# Patient Record
Sex: MALE | Race: ASIAN | NOT HISPANIC OR LATINO | Employment: FULL TIME | URBAN - METROPOLITAN AREA
[De-identification: names, ages, dates, MRNs, and addresses within clinical notes are randomized per-mention and may not be internally consistent; named-entity substitution may affect disease eponyms.]

---

## 2018-10-13 ENCOUNTER — AMBULATORY - HEALTHEAST (OUTPATIENT)
Dept: NURSING | Facility: CLINIC | Age: 28
End: 2018-10-13

## 2019-12-17 ENCOUNTER — OFFICE VISIT - HEALTHEAST (OUTPATIENT)
Dept: FAMILY MEDICINE | Facility: CLINIC | Age: 29
End: 2019-12-17

## 2019-12-17 ENCOUNTER — COMMUNICATION - HEALTHEAST (OUTPATIENT)
Dept: NURSING | Facility: CLINIC | Age: 29
End: 2019-12-17

## 2019-12-17 DIAGNOSIS — Z76.89 ESTABLISHING CARE WITH NEW DOCTOR, ENCOUNTER FOR: ICD-10-CM

## 2019-12-17 DIAGNOSIS — S06.6X9S SUBARACHNOID HEMORRHAGE FOLLOWING INJURY, WITH LOSS OF CONSCIOUSNESS, SEQUELA (H): ICD-10-CM

## 2019-12-17 DIAGNOSIS — Z60.9 SOCIAL PROBLEM: ICD-10-CM

## 2019-12-17 DIAGNOSIS — S06.9XAS LATE EFFECT OF INTRACRANIAL INJURY (H): ICD-10-CM

## 2019-12-17 DIAGNOSIS — H53.003 LAZY EYE OF BOTH SIDES: ICD-10-CM

## 2019-12-17 SDOH — SOCIAL STABILITY - SOCIAL INSECURITY: PROBLEM RELATED TO SOCIAL ENVIRONMENT, UNSPECIFIED: Z60.9

## 2019-12-17 ASSESSMENT — MIFFLIN-ST. JEOR: SCORE: 1492.66

## 2019-12-18 ENCOUNTER — TRANSCRIBE ORDERS (OUTPATIENT)
Dept: OTHER | Age: 29
End: 2019-12-18

## 2019-12-18 DIAGNOSIS — S06.9XAS LATE EFFECT OF INTRACRANIAL INJURY (H): Primary | ICD-10-CM

## 2020-01-03 ENCOUNTER — AMBULATORY - HEALTHEAST (OUTPATIENT)
Dept: NURSING | Facility: CLINIC | Age: 30
End: 2020-01-03

## 2020-01-03 ASSESSMENT — ACTIVITIES OF DAILY LIVING (ADL): DEPENDENT_IADLS:: INDEPENDENT

## 2020-01-06 ENCOUNTER — COMMUNICATION - HEALTHEAST (OUTPATIENT)
Dept: NURSING | Facility: CLINIC | Age: 30
End: 2020-01-06

## 2020-01-17 ENCOUNTER — OFFICE VISIT (OUTPATIENT)
Dept: PHYSICAL MEDICINE AND REHAB | Facility: CLINIC | Age: 30
End: 2020-01-17
Attending: FAMILY MEDICINE
Payer: COMMERCIAL

## 2020-01-17 VITALS
OXYGEN SATURATION: 100 % | HEIGHT: 61 IN | SYSTOLIC BLOOD PRESSURE: 106 MMHG | DIASTOLIC BLOOD PRESSURE: 69 MMHG | HEART RATE: 56 BPM

## 2020-01-17 DIAGNOSIS — R42 DIZZINESS: Primary | ICD-10-CM

## 2020-01-17 RX ORDER — MECLIZINE HYDROCHLORIDE 25 MG/1
25 TABLET ORAL 3 TIMES DAILY PRN
Qty: 60 TABLET | Refills: 0 | Status: SHIPPED | OUTPATIENT
Start: 2020-01-17 | End: 2020-01-17

## 2020-01-17 RX ORDER — MECLIZINE HYDROCHLORIDE 25 MG/1
25 TABLET ORAL 3 TIMES DAILY PRN
Qty: 60 TABLET | Refills: 0 | Status: SHIPPED | OUTPATIENT
Start: 2020-01-17 | End: 2024-02-22

## 2020-01-17 SDOH — HEALTH STABILITY: MENTAL HEALTH: HOW OFTEN DO YOU HAVE A DRINK CONTAINING ALCOHOL?: NEVER

## 2020-01-17 ASSESSMENT — ANXIETY QUESTIONNAIRES
3. WORRYING TOO MUCH ABOUT DIFFERENT THINGS: NOT AT ALL
GAD7 TOTAL SCORE: 0
7. FEELING AFRAID AS IF SOMETHING AWFUL MIGHT HAPPEN: NOT AT ALL
4. TROUBLE RELAXING: NOT AT ALL
2. NOT BEING ABLE TO STOP OR CONTROL WORRYING: NOT AT ALL
5. BEING SO RESTLESS THAT IT IS HARD TO SIT STILL: NOT AT ALL
GAD7 TOTAL SCORE: 0
7. FEELING AFRAID AS IF SOMETHING AWFUL MIGHT HAPPEN: NOT AT ALL
6. BECOMING EASILY ANNOYED OR IRRITABLE: NOT AT ALL
1. FEELING NERVOUS, ANXIOUS, OR ON EDGE: NOT AT ALL
GAD7 TOTAL SCORE: 0

## 2020-01-17 ASSESSMENT — PAIN SCALES - GENERAL: PAINLEVEL: MILD PAIN (2)

## 2020-01-17 ASSESSMENT — PATIENT HEALTH QUESTIONNAIRE - PHQ9
10. IF YOU CHECKED OFF ANY PROBLEMS, HOW DIFFICULT HAVE THESE PROBLEMS MADE IT FOR YOU TO DO YOUR WORK, TAKE CARE OF THINGS AT HOME, OR GET ALONG WITH OTHER PEOPLE: NOT DIFFICULT AT ALL
SUM OF ALL RESPONSES TO PHQ QUESTIONS 1-9: 0
SUM OF ALL RESPONSES TO PHQ QUESTIONS 1-9: 0

## 2020-01-17 NOTE — LETTER
January 17, 2020          Excuse from work for appointments Seen in clinic January 17, 2020.            Sincerely,      Pricilla Hope MD
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
## 2020-01-18 ASSESSMENT — PATIENT HEALTH QUESTIONNAIRE - PHQ9: SUM OF ALL RESPONSES TO PHQ QUESTIONS 1-9: 0

## 2020-01-18 ASSESSMENT — ANXIETY QUESTIONNAIRES: GAD7 TOTAL SCORE: 0

## 2020-01-24 ENCOUNTER — ANCILLARY PROCEDURE (OUTPATIENT)
Dept: CT IMAGING | Facility: CLINIC | Age: 30
End: 2020-01-24
Attending: PHYSICAL MEDICINE & REHABILITATION
Payer: COMMERCIAL

## 2020-01-27 ENCOUNTER — TELEPHONE (OUTPATIENT)
Dept: PHYSICAL MEDICINE AND REHAB | Facility: CLINIC | Age: 30
End: 2020-01-27

## 2020-02-03 ENCOUNTER — COMMUNICATION - HEALTHEAST (OUTPATIENT)
Dept: NURSING | Facility: CLINIC | Age: 30
End: 2020-02-03

## 2020-02-11 ENCOUNTER — TELEPHONE (OUTPATIENT)
Dept: PHYSICAL MEDICINE AND REHAB | Facility: CLINIC | Age: 30
End: 2020-02-11

## 2020-02-18 ENCOUNTER — COMMUNICATION - HEALTHEAST (OUTPATIENT)
Dept: NURSING | Facility: CLINIC | Age: 30
End: 2020-02-18

## 2020-02-26 ENCOUNTER — COMMUNICATION - HEALTHEAST (OUTPATIENT)
Dept: NURSING | Facility: CLINIC | Age: 30
End: 2020-02-26

## 2020-03-11 ENCOUNTER — COMMUNICATION - HEALTHEAST (OUTPATIENT)
Dept: NURSING | Facility: CLINIC | Age: 30
End: 2020-03-11

## 2020-03-13 ENCOUNTER — COMMUNICATION - HEALTHEAST (OUTPATIENT)
Dept: NURSING | Facility: CLINIC | Age: 30
End: 2020-03-13

## 2020-03-24 ENCOUNTER — COMMUNICATION - HEALTHEAST (OUTPATIENT)
Dept: FAMILY MEDICINE | Facility: CLINIC | Age: 30
End: 2020-03-24

## 2020-03-24 ENCOUNTER — OFFICE VISIT - HEALTHEAST (OUTPATIENT)
Dept: FAMILY MEDICINE | Facility: CLINIC | Age: 30
End: 2020-03-24

## 2020-03-24 ENCOUNTER — RECORDS - HEALTHEAST (OUTPATIENT)
Dept: ADMINISTRATIVE | Facility: OTHER | Age: 30
End: 2020-03-24

## 2020-03-24 DIAGNOSIS — S06.9XAS LATE EFFECT OF INTRACRANIAL INJURY (H): ICD-10-CM

## 2020-04-06 ENCOUNTER — COMMUNICATION - HEALTHEAST (OUTPATIENT)
Dept: CARE COORDINATION | Facility: CLINIC | Age: 30
End: 2020-04-06

## 2020-04-14 ENCOUNTER — COMMUNICATION - HEALTHEAST (OUTPATIENT)
Dept: NURSING | Facility: CLINIC | Age: 30
End: 2020-04-14

## 2020-04-24 ENCOUNTER — TELEPHONE (OUTPATIENT)
Dept: PHYSICAL MEDICINE AND REHAB | Facility: CLINIC | Age: 30
End: 2020-04-24

## 2020-04-24 NOTE — TELEPHONE ENCOUNTER
Called patient with assistance of  service. Message left to inform patient that appointment would be converted to telephone visit on 5/1/2020. Contact information provided.

## 2020-04-28 ENCOUNTER — COMMUNICATION - HEALTHEAST (OUTPATIENT)
Dept: NURSING | Facility: CLINIC | Age: 30
End: 2020-04-28

## 2020-04-30 ENCOUNTER — TELEPHONE (OUTPATIENT)
Dept: PHYSICAL MEDICINE AND REHAB | Facility: CLINIC | Age: 30
End: 2020-04-30

## 2020-05-01 ENCOUNTER — VIRTUAL VISIT (OUTPATIENT)
Dept: PHYSICAL MEDICINE AND REHAB | Facility: CLINIC | Age: 30
End: 2020-05-01
Payer: COMMERCIAL

## 2020-05-01 ENCOUNTER — TELEPHONE (OUTPATIENT)
Dept: PHYSICAL MEDICINE AND REHAB | Facility: CLINIC | Age: 30
End: 2020-05-01

## 2020-05-01 DIAGNOSIS — M79.18 MYOFASCIAL PAIN SYNDROME, CERVICAL: ICD-10-CM

## 2020-05-01 DIAGNOSIS — I60.9 SAH (SUBARACHNOID HEMORRHAGE) (H): Primary | ICD-10-CM

## 2020-05-01 DIAGNOSIS — H81.13 BENIGN PAROXYSMAL POSITIONAL VERTIGO, BILATERAL: ICD-10-CM

## 2020-05-01 RX ORDER — ACETAMINOPHEN 500 MG
500-1000 TABLET ORAL EVERY 6 HOURS PRN
COMMUNITY

## 2020-05-01 NOTE — LETTER
May 1, 2020      To Whom it may concern,        Please excuse Javier Dickson from work April 30th and May 1 for Medical appointments.        Sincerely,      Cole Mcarthur DO
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
## 2020-05-07 ENCOUNTER — COMMUNICATION - HEALTHEAST (OUTPATIENT)
Dept: NURSING | Facility: CLINIC | Age: 30
End: 2020-05-07

## 2020-05-18 ENCOUNTER — COMMUNICATION - HEALTHEAST (OUTPATIENT)
Dept: NURSING | Facility: CLINIC | Age: 30
End: 2020-05-18

## 2020-05-21 ENCOUNTER — COMMUNICATION - HEALTHEAST (OUTPATIENT)
Dept: CARE COORDINATION | Facility: CLINIC | Age: 30
End: 2020-05-21

## 2020-06-01 ENCOUNTER — COMMUNICATION - HEALTHEAST (OUTPATIENT)
Dept: NURSING | Facility: CLINIC | Age: 30
End: 2020-06-01

## 2020-09-25 ENCOUNTER — COMMUNICATION - HEALTHEAST (OUTPATIENT)
Dept: NURSING | Facility: CLINIC | Age: 30
End: 2020-09-25

## 2020-10-06 ENCOUNTER — AMBULATORY - HEALTHEAST (OUTPATIENT)
Dept: CARE COORDINATION | Facility: CLINIC | Age: 30
End: 2020-10-06

## 2020-10-06 DIAGNOSIS — Z78.9 HEALTH CARE HOME, ACTIVE CARE COORDINATION: ICD-10-CM

## 2020-10-08 ENCOUNTER — COMMUNICATION - HEALTHEAST (OUTPATIENT)
Dept: NURSING | Facility: CLINIC | Age: 30
End: 2020-10-08

## 2020-10-09 ENCOUNTER — COMMUNICATION - HEALTHEAST (OUTPATIENT)
Dept: NURSING | Facility: CLINIC | Age: 30
End: 2020-10-09

## 2020-11-09 ENCOUNTER — COMMUNICATION - HEALTHEAST (OUTPATIENT)
Dept: NURSING | Facility: CLINIC | Age: 30
End: 2020-11-09

## 2020-11-24 ENCOUNTER — COMMUNICATION - HEALTHEAST (OUTPATIENT)
Dept: CARE COORDINATION | Facility: CLINIC | Age: 30
End: 2020-11-24

## 2020-12-09 ENCOUNTER — COMMUNICATION - HEALTHEAST (OUTPATIENT)
Dept: NURSING | Facility: CLINIC | Age: 30
End: 2020-12-09

## 2020-12-17 ENCOUNTER — COMMUNICATION - HEALTHEAST (OUTPATIENT)
Dept: NURSING | Facility: CLINIC | Age: 30
End: 2020-12-17

## 2021-01-18 ENCOUNTER — COMMUNICATION - HEALTHEAST (OUTPATIENT)
Dept: NURSING | Facility: CLINIC | Age: 31
End: 2021-01-18

## 2021-01-19 ENCOUNTER — COMMUNICATION - HEALTHEAST (OUTPATIENT)
Dept: NURSING | Facility: CLINIC | Age: 31
End: 2021-01-19

## 2021-01-25 ENCOUNTER — COMMUNICATION - HEALTHEAST (OUTPATIENT)
Dept: NURSING | Facility: CLINIC | Age: 31
End: 2021-01-25

## 2021-01-28 ENCOUNTER — COMMUNICATION - HEALTHEAST (OUTPATIENT)
Dept: NURSING | Facility: CLINIC | Age: 31
End: 2021-01-28

## 2021-02-01 ENCOUNTER — COMMUNICATION - HEALTHEAST (OUTPATIENT)
Dept: CARE COORDINATION | Facility: CLINIC | Age: 31
End: 2021-02-01

## 2021-02-08 ENCOUNTER — COMMUNICATION - HEALTHEAST (OUTPATIENT)
Dept: NURSING | Facility: CLINIC | Age: 31
End: 2021-02-08

## 2021-02-25 ENCOUNTER — COMMUNICATION - HEALTHEAST (OUTPATIENT)
Dept: NURSING | Facility: CLINIC | Age: 31
End: 2021-02-25

## 2021-03-02 ENCOUNTER — COMMUNICATION - HEALTHEAST (OUTPATIENT)
Dept: NURSING | Facility: CLINIC | Age: 31
End: 2021-03-02

## 2021-03-15 ENCOUNTER — COMMUNICATION - HEALTHEAST (OUTPATIENT)
Dept: NURSING | Facility: CLINIC | Age: 31
End: 2021-03-15

## 2021-03-16 ENCOUNTER — COMMUNICATION - HEALTHEAST (OUTPATIENT)
Dept: CARE COORDINATION | Facility: CLINIC | Age: 31
End: 2021-03-16

## 2021-03-26 ENCOUNTER — COMMUNICATION - HEALTHEAST (OUTPATIENT)
Dept: NURSING | Facility: CLINIC | Age: 31
End: 2021-03-26

## 2021-03-30 ENCOUNTER — COMMUNICATION - HEALTHEAST (OUTPATIENT)
Dept: CARE COORDINATION | Facility: CLINIC | Age: 31
End: 2021-03-30

## 2021-04-15 ENCOUNTER — COMMUNICATION - HEALTHEAST (OUTPATIENT)
Dept: NURSING | Facility: CLINIC | Age: 31
End: 2021-04-15

## 2021-04-22 ENCOUNTER — COMMUNICATION - HEALTHEAST (OUTPATIENT)
Dept: NURSING | Facility: CLINIC | Age: 31
End: 2021-04-22

## 2021-05-17 ENCOUNTER — COMMUNICATION - HEALTHEAST (OUTPATIENT)
Dept: NURSING | Facility: CLINIC | Age: 31
End: 2021-05-17

## 2021-05-20 ENCOUNTER — RECORDS - HEALTHEAST (OUTPATIENT)
Dept: ADMINISTRATIVE | Facility: OTHER | Age: 31
End: 2021-05-20

## 2021-05-20 ENCOUNTER — COMMUNICATION - HEALTHEAST (OUTPATIENT)
Dept: CARE COORDINATION | Facility: CLINIC | Age: 31
End: 2021-05-20

## 2021-06-01 ENCOUNTER — COMMUNICATION - HEALTHEAST (OUTPATIENT)
Dept: NURSING | Facility: CLINIC | Age: 31
End: 2021-06-01

## 2021-06-03 ENCOUNTER — COMMUNICATION - HEALTHEAST (OUTPATIENT)
Dept: NURSING | Facility: CLINIC | Age: 31
End: 2021-06-03

## 2021-06-04 VITALS
WEIGHT: 137.7 LBS | RESPIRATION RATE: 16 BRPM | DIASTOLIC BLOOD PRESSURE: 66 MMHG | HEIGHT: 64 IN | HEART RATE: 60 BPM | BODY MASS INDEX: 23.51 KG/M2 | SYSTOLIC BLOOD PRESSURE: 90 MMHG
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
## 2021-06-05 NOTE — PROGRESS NOTES
1-6-20  Patient enrolled in [facility] as of 1-3-20  [facility] SW completed assessment.  No upcoming appt at this time.      Plan:  CHW follow up 1-3-20

## 2021-06-05 NOTE — PROGRESS NOTES
2-3-20  Scheduled Follow Up Call: Attempt 1 Community Health Worker called and left a message for the patient. If the patient is returning my call, please transfer the patient to [phone].    No upcoming appt at this time.    Plan:  CHW follow up 2-17-20
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
## 2021-06-06 NOTE — PROGRESS NOTES
3/11/2020   Clinic Care Coordination Contact    Follow Up Progress Note      Assessment:     Goals addressed this encounter:   Goals Addressed                 This Visit's Progress       Patient Stated      Psychosocial (pt-stated)        Goal Statement- I want to work with an agency to assist me with citizenship within 1-2 months    Action steps to achieve this goal  1.  I will meet with  Alban Bergeron at [facility] this Friday 3-13-20 at 9:30am.   2.  I will update CCC team with the status at next outreach.    Updated: 3-11-20 AL  Date goal set:  1/3/2020 BC                 Intervention/Education provided during outreach:   Received call from patient that he has appt with  on 3-13-20 at 9:30am at [facility].            Plan:     Care Coordinator will follow up in a month 4-14-20

## 2021-06-06 NOTE — PROGRESS NOTES
2020    Clinic Care Coordination Contact    Follow Up Progress Note      Assessment: 1-3-20    Goals addressed this encounter:   Goals Addressed                 This Visit's Progress       Patient Stated      Medical (pt-stated)        Goal Statement- I want to address my medical concerns within one month    Action steps to achieve this goal  1.  I will follow up with neurologist on 20 to talk about the result.    2.  I will update CCC team at next outreach.    Updated: 20 AL  Date goal set:  1/3/2020 BC            Psychosocial (pt-stated)        Goal Statement- I want to work with an agency to assist me with citizenship within 1-2 months    Action steps to achieve this goal  1.  I will wait for  Alban Clemente from [facility] to return my call.  2.  I will update CCC team with the status at next outreach.    Updated: 20  Date goal set:  1/3/2020 BC                 Intervention/Education provided during outreach:   Received return call from patient.  Spoke to patient through Luiza  Manish.  Patient reported:  -he works time. Mon-Friday  6 am -4 pm get home after 5pm. Best time to call after 5 pm   -attended neurology appt and has follow up on 20    doctor did not say much but will tell more at next appt on 20  -no calls from [facility]    CHW conference call with patient and supported to connect with [facility] [phone] to check on status of referral for citizenship.  Spoke to staff and prompted patient to verify his name and   Transferred to connect with [facility] assigned .  Supported to leave message for [facility] to call patient back. Provided patient contact number and best time to call due to work schedule.  Patient will check voice message and call [facility]  Provided patient [facility] contact number to call on his own.       Plan:   Care Coordinator will follow up in a month 3-26-20 at 5pm due to work schedule

## 2021-06-07 NOTE — TELEPHONE ENCOUNTER
Work note completed and ready for  at , patient verbalized understanding. No further questions. Lisbeth Keyes LPN

## 2021-06-07 NOTE — PROGRESS NOTES
4/14/2020   Clinic Care Coordination Contact  [facility]/Voicemail       Clinical Data: Care Coordinator Outreach  Outreach attempted x 1. [facility] Luiza  Issac and left message on patient's voicemail with call back information and requested return call.  Plan: Care Coordinator will try to reach patient again in 10 business days.  4-28-20

## 2021-06-07 NOTE — PROGRESS NOTES
4/28/2020  Review chart.  Speciality  faxed order to [facility] for eval and  n-648 citizenship waiver     Clinic Care Coordination Contact  [facility]/Voicemail    Referral Source: PCP  Clinical Data: Care Coordinator Outreach  Outreach attempted x 1.  Luiza  Ashley #[id number] left message on patient's voicemail with call back information and requested return call.  Plan: Care Coordinatorwisharon try to reach patient again 5-7-20

## 2021-06-07 NOTE — PROGRESS NOTES
Clinic Care Coordination Contact    Situation: Patient chart reviewed by care coordinator.    Background: WARREN completed chart review    Assessment: Patient is working with [facility] for citizenship. He was seen by PCP to get referral for N-648 evaluation. Has an appointment to review neurology results on 5/1/2020    Plan/Recommendations: WARREN will review/outreach in about 45 days

## 2021-06-07 NOTE — PROGRESS NOTES
"Javier Dickson is a 29 y.o. male who is being evaluated via a billable telephone visit.      The patient has been notified of following:     \"This telephone visit will be conducted via a call between you and your physician/provider. We have found that certain health care needs can be provided without the need for a physical exam.  This service lets us provide the care you need with a short phone conversation.  If a prescription is necessary we can send it directly to your pharmacy.  If lab work is needed we can place an order for that and you can then stop by our lab to have the test done at a later time.    If during the course of the call the physician/provider feels a telephone visit is not appropriate, you will not be charged for this service.\"         Javier Dickson complains of    Chief Complaint   Patient presents with     Form     medical waiver        I have reviewed and updated the patient's Past Medical History, Social History, Family History and Medication List.    ALLERGIES  Patient has no known allergies.    Additional provider notes:   Person hung up the phone prior to me talking to him.  Appeared upset that this was a telephone encounter as he states he received a message yesterday stating that he had a clinic visit.  Needs a note for work today.  Wife had also answered on the phone and she stated that was not true and told that was a telephone visit.  He also might of been upset that I do not do the medical waiver myself.    Assessment/Plan:  1. Late effect of intracranial injury (H)  Work note provided if patient needs to   Referral placed for neuropsych testing for medical waiver  Will forward to care guide  - [facility] REFERRAL TO MENTAL HEALTH AND ADDICTION  - Adult (18+); Assessment and Testing; Neuropsychological Testing; [facility] [phone]; We will contact you to schedule the appointment or please call with any questions      Phone call duration:  0 minutes      "

## 2021-06-08 NOTE — PROGRESS NOTES
5/7/2020   Clinic Care Coordination Contact    Community Health Worker Follow Up    Goals:   Goals Addressed    None       Called and spoke to patient. Stated he is not available to talk because he is taking his son to the hospital.  Patient unable to write down CHW number.  Informed patient CHW will follow up next week and to focus on taking care of his son.    CHW Next Follow-up:  5-14-20

## 2021-06-08 NOTE — PROGRESS NOTES
5/18/2020   Clinic Care Coordination Contact  [facility]/Voicemail       Clinical Data: Care Coordinator Outreach  Outreach attempted x 1. Called with Luiza  Cheryl #[phone] and left message on patient's voicemail with call back information and requested return call.  Plan: Care Coordinator  will try to reach patient again 6-1-20

## 2021-06-08 NOTE — PROGRESS NOTES
Clinic Care Coordination Contact    Situation: Patient chart reviewed by care coordinator.    Background: SW completed chart review    Assessment: Patient has been unable to be reached recently by CHW. CHW doing standard outreach. Last contact, patient is still working toward citizenship goal.    Plan/Recommendations: SW will chart review/outreach in 45 days

## 2021-06-08 NOTE — PROGRESS NOTES
6/1/2020  Clinic Care Coordination Contact  [facility]/Voicemail       Clinical Data: Care Coordinator Outreach  Outreach attempted x 3.  Left message on patient's voicemail with call back information and requested return call.  Plan: Care Coordinator will send disenrollment letter with care coordinator contact information via mail.   Care Coordinator will do no further outreaches at this time.    Patient has been mailed disenrollment letter and was provided with CHW contact information if they are interested in accessing Clinic Care Coordination.   Order for Care Management has been closed, no further outreach will be done at this time and patient can be re-referred.

## 2021-06-09 ENCOUNTER — COMMUNICATION - HEALTHEAST (OUTPATIENT)
Dept: NURSING | Facility: CLINIC | Age: 31
End: 2021-06-09

## 2021-06-10 ENCOUNTER — COMMUNICATION - HEALTHEAST (OUTPATIENT)
Dept: NURSING | Facility: CLINIC | Age: 31
End: 2021-06-10

## 2021-06-10 ENCOUNTER — COMMUNICATION - HEALTHEAST (OUTPATIENT)
Dept: CARE COORDINATION | Facility: CLINIC | Age: 31
End: 2021-06-10

## 2021-06-11 NOTE — PROGRESS NOTES
9/25/2020  Received call from patient.  Connected with [facility]  Tonia.  Patient requesting help regarding status of his citizenship application.  Stated he turned  in the medical waiver form already to the . He had medical waiver done at [facility].    Conference call to [facility] and verify  is [facility]. CHW left voices with  [facility] to call patient back.  Provided patient call back number.    Provided patient number to [facility]

## 2021-06-11 NOTE — PROGRESS NOTES
9/25/2020  Received call from patient again.   Stated he does not want transfer clinic to [place].   Stated he wants to stay with [facility] because it is better.  Ask if his whole family can still go to [facility] even though they move.  Explained to patient that his family can continue to come to [facility].  Stated his sister in law was able to continue to go to [facility] and lives in [place].    Scheduled initial assessment with ALOK KELLEY on 10-8-20 at 2pm.     Clinic Care Coordination Contact  Community Health Worker Initial Outreach    CHW Initial Information Gathering:  Referral Source: Self-patient/Caregiver  Preferred Urgent Care: [facility], [phone]  Current living arrangement:: I live in a private home with family  Type of residence:: Other([place] home)  Community Resources: None  Supplies used at home:: None  Equipment Currently Used at Home: none  Informal Support system:: Family, Spouse  Transportation means:: Regular car       Patient accepts CC: Yes. Patient scheduled for assessment with ALOK KELLEY on 10-8-20  at 2pm. Patient noted desire to discuss help citizenship and recently move to [place], [place].     Patient changed is mind does not want to transfer care to clinic in [place] will continue to stay at [facility].    CHW Follow up: 10-8-20 review assessment, goals and consult with CCC SW/RN if needed.

## 2021-06-12 NOTE — PROGRESS NOTES
11/9/2020   Clinic Care Coordination Contact    Community Health Worker Follow Up    Goals:   Goals Addressed                 This Visit's Progress       Patient Stated      Problem Solving (pt-stated)   20%     Goal Statement: I want to know the status of my citizenship application within one month  Date Goal set: 10/08/20  Barriers: Pamela  Strengths:   Date to Achieve By: 11/30/2020  Patient expressed understanding of goal: Yes  Action steps to achieve this goal:  1. I will wait to hear from [facility] or wait for packet/form from [facility]  2. I will update CC Team at next outreach    Updated: 11-9-20 AL          Received call from patient's wife.  [facility] Luiza : Mark Contreras   Wife reported they have not received anything in the mail or hear from [facility] regarding citizenship process.    Provided wife Alban Bergeron , [facility] contact information for them to call and follow up on citizenship.  CHW supported to connect with [facility] and left voice message to call them back.      CHW Next Follow-up: 12-9-20    CHW Plan: follow up on goal

## 2021-06-12 NOTE — PROGRESS NOTES
10/8/2020  Patient enrolled in [facility] as of 10-8-20  Reviewed assessment, goals, and any delegations from [facility] RN/CCC SW    CHW follow up: 11-10-20  Wife also enrolled in [facility]

## 2021-06-12 NOTE — PROGRESS NOTES
Clinic Care Coordination Contact  Program: none   County: [facility] Case #: [id number]   [facility] Worker:   Lianne #:   PMI #: [id number]  Date Applied:   Renewal Month:       Outreach:   10/9/20: FRW spoke with pt. He said he already updated his address and income at the [facility]. FRW verified the address [place] and he is active with [place]. He has no other needs at this time.    Health Insurance:  [place]: Medical Assistance.  Elig Type AA: Parent of a dependent child  Eligibility Begin Date: 04/01/2020  Eligibility End Date: 12/31/2020    MA12 - Prepaid Medical Assistance Program (PMAP) delivered through [facility].    Referral/Screening:   Patient needs help with insurance. Had [employer] because he had a job. Just moved to [place], which is [facility]. He has no job, so he needs to update income and address. Also need help applying for/changing address for SNAP.     No income right now.     Two adults and two children   Children's mother is Fabi Paw   Child is Ree Ozzy [facility]     Goals Addressed:   Goals Addressed                 This Visit's Progress       Patient Stated      Financial Wellbeing (pt-stated)   100%     Goal Statement: I want to update my family and my insurance/snap information within one month  Date Goal set: 10/08/20  Barriers: Language, Moved  Strengths:   Date to Achieve By: 11/30/2020  Patient expressed understanding of goal: Yes  Action steps to achieve this goal:  1. I already updated my address and income to the [facility] and [facility].

## 2021-06-13 NOTE — PROGRESS NOTES
Clinic Care Coordination Contact    Situation: Patient chart reviewed by care coordinator.    Background: WARREN completed chart review     Assessment: SW didn't hear from [facility] yet, CHW provided patient the phone number for them to call too.     Plan/Recommendations: WARREN will chart review in 45 days
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
## 2021-06-13 NOTE — PROGRESS NOTES
Clinic Care Coordination Contact    Follow Up Progress Note      Assessment: WARREN contacted Javier Dickson with an . He reported he is already working with someone who is helping him find a job. He reported he was going to call his  to update his income. WARREN encouraged this so he may be able to get an increase in support.     Goals addressed this encounter:   Goals Addressed                 This Visit's Progress      COMPLETED: Other (pt-stated)        I have someone from a [employer] helping me with employment             Other (pt-stated)        Goal Statement: I would like to know if am eligible for any financial assistance or county benefit SNAP, rental assistance due to no income at this time.  Date Goal set: 12/9/2020    Barriers: language, no income, terminated from work  Strengths: family support  Date to Achieve By: 3-1-20  Patient expressed understanding of goal: Yes  Action steps to achieve this goal:  1. I will talk to our financial worker to update our income change  2. I will update at next outreach.                Intervention/Education provided during outreach: See above          Plan:   Standard Outreach

## 2021-06-14 NOTE — PROGRESS NOTES
Clinic Care Coordination Contact  Program: [facility]: Leonel (Benefits in [facility])  [facility] Case #: [id number]   County Worker: Tonio, [phone]       Outreach:    1/28/2021: FRW spoke with patient, he said he spoke with the  and they are only giving him $90 this month and will process the changes for next month. His benefit will increase for next month. Patient said he doesn't have enough money this month for food, FRW inquired if he needed help with food support resources, pt said he didn't know how to get to the food shelf.  Message sent to CHW to follow up. FRW will check EZ info line in 1 month to see if benefits increased.   1/19/2021: FRW called patient regarding referral. Patient said he reported his income to [facility] in December when he stopped working. He wrote them a letter and verified with them that they received it. His benefits went from $500 to $90. Their benefits should not have gone down. FRW called the  listed on patient's notice and left detailed VM. FRW will follow up next week.     (patient read off his  information. Listed on there was Deyanira at #[phone], and another phone #[phone].   Ext [phone] goes to Tonio- [facility] supervisor. Ext [phone] goes to a male ).       Referral/Screening:   Financial Resource Worker Screening     [facility] Benefits  Is patient requesting help applying for [facility] benefits?: No  Is patient requesting an increase in SNAP/CASH?: Yes  Are you due for a renewal?: No  Did you have an income change?: No  Was there a household change?: No     [place]:  [place] No  Is this an insurance renewal?: No  Is this a new insurance application request?: No     Any other information for the FRW?: not sure why he only gets $90 not $300 of food stamp. he has still not working and wife not working.  no income.    Hser Paw- Income ended 12/9/20  No one is working in the  household   $500 went down to $90.       Goals Addressed:   Goals Addressed                 This Visit's Progress       Patient Stated      COMPLETED: Other (pt-stated)        Goal Statement: I have food support and rental assistance from the [facility].  Date Goal set: 12/9/2020    Barriers: language, no income, terminated from work  Strengths: family support  Date to Achieve By: 3-1-20  Patient expressed understanding of goal: Yes  Personal Plan:  1. I called the  on 1/19 to report my income change again.   2. I spoke with the  and they will increase my SNAP benefit in February 2021.   3. I will connect with the CHW, Arlette, for further food support resources.       [facility] Human Services  [address] [place] [id number]  [phone]  info line to check on benefits  [phone]
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
## 2021-06-14 NOTE — PROGRESS NOTES
Clinic Care Coordination Contact  Program: [facility]: Leonel (Benefits in [facility])  [facility] Case #: [id number]   County Worker: Tonio, [phone]       Outreach:    1/19/2021: FRW called patient regarding referral. Patient said he reported his income to [facility] in December when he stopped working. He wrote them a letter and verified with them that they received it. His benefits went from $500 to $90. Their benefits should not have gone down. FRW called the  listed on patient's notice and left detailed VM. FRW will follow up next week.     (patient read off his  information. Listed on there was Deyanira at #[phone], and another phone #[phone].   Ext [phone] goes to Tonio- [facility] supervisor.   Ext [phone] goes to a male ).     Referral/Screening:   Financial Resource Worker Screening     [facility] Benefits  Is patient requesting help applying for [facility] benefits?: No  Is patient requesting an increase in SNAP/CASH?: Yes  Are you due for a renewal?: No  Did you have an income change?: No  Was there a household change?: No     [place]:  [place] No  Is this an insurance renewal?: No  Is this a new insurance application request?: No     Any other information for the FRW?: not sure why he only gets $90 not $300 of food stamp. he has still not working and wife not working.  no income.    Hser Paw- Income ended 12/9/20  No one is working in the household   $500 went down to $90.       Goals Addressed:

## 2021-06-14 NOTE — PROGRESS NOTES
Clinic Care Coordination Contact  Care Team Conversations     SW attempted to reach patient to follow up on food resources and unemployment. No voicemail setup. SW will follow up in one week.

## 2021-06-14 NOTE — PROGRESS NOTES
1/18/2021   Clinic Care Coordination Contact  Financial Resource Worker Screening    [facility] Benefits  Is patient requesting help applying for [facility] benefits?: No  Is patient requesting an increase in SNAP/CASH?: Yes  Are you due for a renewal?: No  Did you have an income change?: No  Was there a household change?: No    [place]:  [place] No  Is this an insurance renewal?: No  Is this a new insurance application request?: No    Any other information for the FRW?: not sure why he only gets $90 not $300 of food stamp. he has still not working and wife not working.  no income.    Care Coordination team will tell patient:   Thank you for answering all the questions, based on screening questions, our Financial Resource Worker will reach out to you with additional questions and next steps.    Clinic Care Coordination Contact    Community Health Worker Follow Up    Goals:   Goals Addressed                 This Visit's Progress       Patient Stated      Other (pt-stated)   100%     Goal Statement: I have food support and rental assistance from the [facility].  Date Goal set: 12/9/2020    Barriers: language, no income, terminated from work  Strengths: family support  Date to Achieve By: 3-1-20  Patient expressed understanding of goal: Yes  Personal Plan:  I will to call to financial worker to update our income change    [facility] Human Services  [address] [place] [address]  [phone] EZ info line to check on benefits  [phone]           Problem Solving (pt-stated)   30%     Goal Statement: I want to know the status of my citizenship application within one month  Date Goal set: 10/08/20  Barriers: Pamela  Strengths:   Date to Achieve By: 11/30/2020  Patient expressed understanding of goal: Yes  Action steps to achieve this goal:  1. [facility] and ask for Luiza  regarding my citizenship application.   2. I will update CC Team at next  outreach    [facility] Legal [facility] ([facility]support to apply for Naturalization (N-400)  [address]  [place] [address]  [phone]  Fax: [phone]  Intake Line [phone]    Updated: 1-18-21 KERON Jarrett :  [facility]  ID#  [id number]  Patient reported:  -received $90 for food stamp and rent assistance for last month but not sure for this month.  Use to get more of food stamp not sure why he only gets $90.  -still not working no job. Wife no job or income.  -not sure about his car payment since he has no job or income.  Sent referral to FRW regarding food stampl    Scheduled cori with CCC SW 1-25-21 at 1pm for any resources car payment.      CHW Follow up: Monthly    CHW Plan: Follow up on goals    CHW Next Follow Up: 2-25-21 .

## 2021-06-15 NOTE — PROGRESS NOTES
Clinic Care Coordination Contact    Follow Up Progress Note      Assessment: CCC SW contacted Javier Dickson with an  to discuss goals.    He doesn't know the status of his citizenship. SW emailed Alban Bergeron at [facility], waiting for update.    Javier Dickson reported he and the family may move back to [place]. Not sure of housing availability. SW will mail a list from [employer].    SW will mail unemployment phone number, unable to leave on his voicemail because it is not setup. Informed him he can only call on Fridays to apply and can ask for an .     Goals addressed this encounter:   Goals Addressed                 This Visit's Progress      Other (pt-stated)        Goal Statement: I would like support on how to apply for unemployment benefit as soon as possible.  Date Goal set: 2-25-21  Barriers: language, lack of information   Strengths: engage with CCC team, food support from the [facility]  Date to Achieve By: 4-25-21  Patient expressed understanding of goal: Yes  Action steps to achieve this goal:  1. I will call [place] [employer] on a Friday to apply   2. I will update CCC team at next outreach.              Problem Solving (pt-stated)        Goal Statement: I want to know the status of my citizenship application within one month  Date Goal set: 10/08/20  Barriers: Pamela  Strengths:   Date to Achieve By: 11/30/2020  Patient expressed understanding of goal: Yes  Action steps to achieve this goal:  1. [facility] WARREN waiting to hear from Alban Bergeron  2. I will update CC Team at next outreach    [facility] Legal Services ([facility])-support to apply for Naturalization (N-400)  [address]  [place] [address]  [phone]  Fax: [phone]  Intake Line [phone]    Updated: 2-25-21 AL             Intervention/Education provided during outreach: See above     Outreach Frequency: monthly    Plan:   SW will chart review/outreach in 2 weeks if needed
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
## 2021-06-15 NOTE — PROGRESS NOTES
"3/4/2021   Message from PCP and [facility]    \"Patient;s  calling  He has to fill out forms for food stamps. He is calling for Dr Shane's help filling it out. Could you help?  He needs a Luiza interpretor with  on the phone Javier Randolph, call: [phone]. Please call after 2 p.m. today. He has to take his children to school.  Thank you,  Nicolasa Smith RN  [facility]\"     "

## 2021-06-15 NOTE — PROGRESS NOTES
FRW received a fax from [facility] with forms completed by patient that need to be sent to the county regarding rent/housing costs. FRW faxed forms to the county last week. FRW will not be adding patient to panel.

## 2021-06-15 NOTE — PROGRESS NOTES
Clinic Care Coordination Contact    Situation: Patient chart reviewed by care coordinator.    Background: SW completed chart review    Assessment: SW has not heard from [facility] yet, waiting response. Patient working with FRW    Plan/Recommendations: WARREN will chart review in two weeks

## 2021-06-15 NOTE — PROGRESS NOTES
Clinic Care Coordination Contact  Program: [facility]: Leonel (Benefits in [facility])  [facility] Case #: [id number]   County Worker: Tonio, [phone]       Outreach:    2/25/2021: FRW checked EZ info line to make sure that SNAP benefit increased this month. Benefit was issued on 2/13/2021 for $692 for January and $782 for February. Benefit did increase. No further needs from FRW.   1/28/2021: FRW spoke with patient, he said he spoke with the  and they are only giving him $90 this month and will process the changes for next month. His benefit will increase for next month. Patient said he doesn't have enough money this month for food, FRW inquired if he needed help with food support resources, pt said he didn't know how to get to the food shelf.  Message sent to CHW to follow up. FRW will check EZ info line in 1 month to see if benefits increased.   1/19/2021: FRW called patient regarding referral. Patient said he reported his income to [facility] in December when he stopped working. He wrote them a letter and verified with them that they received it. His benefits went from $500 to $90. Their benefits should not have gone down. FRW called the  listed on patient's notice and left detailed VM. FRW will follow up next week.     (patient read off his  information. Listed on there was Deyanira at #[phone], and another phone #[phone].   Ext [phone] goes to Tonio- [employer]SE [employer]AN supervisor. Ext [phone] goes to a male ).       Referral/Screening:   Financial Resource Worker Screening     [facility] Benefits  Is patient requesting help applying for [facility] benefits?: No  Is patient requesting an increase in SNAP/CASH?: Yes  Are you due for a renewal?: No  Did you have an income change?: No  Was there a household change?: No     Insurance:  [place] No  Is this an insurance renewal?: No  Is this a new insurance application request?:  No     Any other information for the FRW?: not sure why he only gets $90 not $300 of food stamp. he has still not working and wife not working.  no income.    Hser Paw- Income ended 12/9/20  No one is working in the household   $500 went down to $90.       Goals Addressed:   Goals Addressed                 This Visit's Progress       Patient Stated      COMPLETED: Other (pt-stated)        Goal Statement: I have food support and rental assistance from the [facility].  Date Goal set: 12/9/2020    Barriers: language, no income, terminated from work  Strengths: family support  Date to Achieve By: 3-1-20  Patient expressed understanding of goal: Yes  Personal Plan:  1. I called the  on 1/19 to report my income change again.   2. I spoke with the  and they will increase my SNAP benefit in February 2021.   3. I will connect with the CHW, Arlette, for further food support resources.       [facility] Human Services  [address] [place] [address]  [phone] EZ info line to check on benefits  [phone]

## 2021-06-15 NOTE — PROGRESS NOTES
Clinic Care Coordination Contact  Care Team Conversations     SW attempted to reach Hser Paw with an  to review food resources and unemployment. He didn't answer and there is no voicemail setup. This was second attempt to reach him for these resources.

## 2021-06-16 PROBLEM — F33.1 MODERATE EPISODE OF RECURRENT MAJOR DEPRESSIVE DISORDER (H): Status: ACTIVE | Noted: 2020-08-07

## 2021-06-16 PROBLEM — F41.1 GENERALIZED ANXIETY DISORDER: Status: ACTIVE | Noted: 2020-08-07

## 2021-06-16 NOTE — PROGRESS NOTES
4/22/2021  Clinic Care Coordination Contact  [facility]       Clinical Data: Care Coordinator Outreach  Outreach attempted x 2. No answer. No VM.       Plan: Care Coordinator will send unable to contact letter with care coordinator contact information via mail.   Care Coordinator will try to reach patient again in 1 month.      CHW follow up: 5-17-21

## 2021-06-16 NOTE — PROGRESS NOTES
Clinic Care Coordination Contact    Situation: Patient chart reviewed by care coordinator.    Background: SW completed chart review    Assessment: No update from [facility] yet. CHW connected with patient, he reported he has a new job and will be moving back to [place] soon.    Plan/Recommendations: WARREN will chart review/outreach in two weeks

## 2021-06-16 NOTE — PROGRESS NOTES
3/26/2021  [facility] Luiza : Lcay    Clinic Care Coordination Contact    Community Health Worker Follow Up    Goals:   Goals Addressed                 This Visit's Progress       Patient Stated      Problem Solving (pt-stated)   30%     Goal Statement: I want to know the status of my citizenship application within one month  Date Goal set: 10/08/20  Barriers: Pamela  Strengths:   Date to Achieve By: 11/30/2020  Patient expressed understanding of goal: Yes  Action steps to achieve this goal:  1. I will wait for  to call me back.  2. I will check with CCC SW if  hear from [facility]  2. I will update CC Team at next outreach    [facility] to apply for Naturalization (N-400)  [address]  [place] [address]  [phone]  Fax: [phone]  Intake Line [phone]    Updated: 3-26-21 AL        [facility] Luiza : Lacy    Called and spoke to patient through Luiza :  and follow up on goals.  Patient reported:   -he started working so no need to apply for  unploygment benefit  -he will be moving back to [facility] soon.  No job in [place].  Will call CHW for support to transition back to [facility].      CHW Follow up: Monthly    CHW Plan: Follow up on goals    CHW Next Follow Up: 4-22-21

## 2021-06-16 NOTE — PROGRESS NOTES
Clinic Care Coordination Contact  Care Team Conversations     SW attempted to reach Hser Paw with an , no answer and no voicemail.     SW has not received an update from [facility] yet regarding citizenship status.

## 2021-06-17 NOTE — PROGRESS NOTES
5/17/2021  Clinic Care Coordination Contact  [facility] Luiza : Lacy        Clinical Data: Care Coordinator Outreach  Outreach attempted x 3.  No answer. No VM  Plan: Care Coordinator consult with CC SW whether to send dis enrollment letter or try 1 more time due work schedule.    CHW Follow up: 6-1-21

## 2021-06-17 NOTE — PROGRESS NOTES
Clinic Care Coordination Contact    Situation: Patient chart reviewed by care coordinator.    Background: SW completed chart review    Assessment: Patient has been unable to be reached    Plan/Recommendations: Standard Outreach

## 2021-06-20 NOTE — LETTER
Letter by Arlette Doss CHW at      Author: Arlette Doss CHW Service: -- Author Type: --    Filed:  Encounter Date: 2/18/2020 Status: (Other)       CARE COORDINATION  [facility] [address] [place] [address]    February 19, 2020    Javier Dickson  [address]  [place] [address]      Dear Abiel Herrera enrolled with the [facility] Care Coordination Services.  In order for us to provide guidance we need to connect on a monthly bases.     We have tried calling you 2 times in the last month and have been unsuccessful in reaching you.     Please call me at [phone] at your earliest convenience.   If you reach my voicemail, please leave a message with your daytime telephone number and a date and time that I can return your call.                                                                              Sincerely,              PAULINE Cortes                                                                     Clinic Care Coordination                                          [facility]

## 2021-06-20 NOTE — LETTER
Letter by Arlette Doss CHW at      Author: Arlette Doss CHW Service: -- Author Type: --    Filed:  Encounter Date: 2/18/2020 Status: (Other)

## 2021-06-20 NOTE — LETTER
Letter by Cortney Shane DO at      Author: Cortney Shane DO Service: -- Author Type: --    Filed:  Encounter Date: 3/24/2020 Status: (Other)         March 24, 2020     Patient: Javier Dickson   YOB: 1990   Date of Visit: 3/24/2020       To Whom it May Concern:    Javier Dickson had an appointment at my clinic on 3/24/2020.    If you have any questions or concerns, please don't hesitate to call.    Sincerely,         Electronically signed by Cortney Shane DO

## 2021-06-20 NOTE — LETTER
Letter by Charles Washington LGSW at      Author: Charles Washington LGSW Service: -- Author Type: --    Filed:  Encounter Date: 1/3/2020 Status: Signed       CARE COORDINATION    January 3, 2020    Hsisidro Randolph  [address] [place] [address]      Dear Javier,    I am a clinic care coordinator who works with Cortney Shane DO at [facility]. I wanted to thank you for spending the time to talk with me.  Below is a description of clinic care coordination and how I can further assist you.     The clinic care coordinator team is made up of a registered nurse,  and community health worker who understand the health care system. The goal of clinic care coordination is to help you manage your health and improve access to the health care system in the most efficient manner. The team can assist you in meeting your health care goals by providing education, coordinating services, strengthening the communication among your providers, assist you with any financial, behavioral, psychosocial, chemical dependency, counseling, and/or psychiatric resources if needed.    Please feel free to contact Arlette Doss, the Community Health Worker, at [phone] with any questions or concerns. We are focused on providing you with the highest-quality healthcare experience possible and that all starts with you.     Sincerely,   Charles Washington  Enclosed: I have enclosed a copy of the Care Plan. This has helpful information and goals that we have talked about. Please keep this in an easy to access place to use as needed.

## 2021-06-20 NOTE — LETTER
Letter by Charles Washington LGSW at      Author: Charles Washington LGSW Service: -- Author Type: --    Filed:  Encounter Date: 1/3/2020 Status: Signed       Care Plan  About Me:    Patient Name:  Javier Dickson    YOB: 1990  Age:         29 y.o.   [facility] MRN:    [id number] Telephone Information:  Home Phone [phone]   Mobile Not on file.       Address:  [address] [place] [address] Email address:  No e-mail address on record      Emergency Contact(s)  Extended Emergency Contact Information      Name: Rishabh Workman  Address:       [address] [place] [address]  Home Phone Number: [phone]  Relation: Father      Name: [place] [address]  Relation: Declined          Primary language:  Luiza     needed? Yes   [employer]:  [phone] op. 1  Other communication barriers: Language barrier  Preferred Method of Communication:     Current living arrangement: I live in a private home  Mobility Status/ Medical Equipment: Independent    Health Maintenance  Health Maintenance Reviewed: Not assessed    My Access Plan  Medical Emergency 911   Primary Clinic Line Cortney Shane DO - [phone]   24 Hour Appointment Line [phone] or  [phone] ([phone]) (toll-free)   24 Hour Nurse Line [phone] (toll-free)   Preferred Urgent Care [facility] - [facility], [phone]   [facility]  [phone]   Preferred Pharmacy [facility] [place] [id number] [facility]     Behavioral Health Crisis Line The National Suicide Prevention Lifeline at 1-650.684.1047 or 911             My Care Team Members  Patient Care Team       Relationship Specialty Notifications Start End    Cortney Shane DO PCP - General Family Medicine  12/17/19     Phone: [phone] Fax: [phone]         [address] [place] [address]    Arlette Doss, CHW Community Health Worker Primary Care - CC Admissions 1/3/20      Phone: [phone] Fax: [phone]        Lacy Tatum, RN Clinic Care Coordinator Primary Care - CC Admissions 1/3/20     Charles Washington LGSW Lead Care Coordinator Primary Care - CC Admissions 1/3/20     Fax: [phone]                 My Care Plans  Self Management and Treatment Plan  Goals and (Comments)  Goals        General    Medical (pt-stated)     Notes - Note created  1/3/2020 11:17 AM by Charles Washington LGSW    Goal Statement- I want to address my medical concerns within one month    Action steps to achieve this goal  1.  I will attend my neurology appointment on 1/17/2020  2.  I will share the results of my appointment with my PCP and CCC team      Date goal set:  1/3/2020 BC          Psychosocial (pt-stated)     Notes - Note created  1/3/2020 11:17 AM by Charles Washington LGSW    Goal Statement- I want to work with an agency to assist me with citizenship within 1-2 months    Action steps to achieve this goal  1.  I will wait for [facility] to contact me  2.  I will update CCC team with the status of my case      Date goal set:  1/3/2020 BC                   Advance Care Plans/Directives Type:        My Medical and Care Information  Problem List   Patient Active Problem List   Diagnosis   ? Intracranial injury of other and unspecified nature, without mention of open intracranial wound, unspecified state of consciousness   ? Late effect of intracranial injury (H)   ? Subarachnoid hemorrhage following injury (H)   ? Lazy eye of both sides      Current Medications and Allergies:  See printed Medication Report.    Care Coordination Start Date: 1/3/2020   Frequency of Care Coordination:     Form Last Updated: 01/03/2020

## 2021-06-20 NOTE — LETTER
Letter by Cortney Shane DO at      Author: Cortney Shane DO Service: -- Author Type: --    Filed:  Encounter Date: 12/17/2019 Status: Signed         December 17, 2019     Patient: Javier Dickson   YOB: 1990   Date of Visit: 12/17/2019       To Whom it May Concern:    Javier Dickson was seen in my clinic on 12/17/2019.    If you have any questions or concerns, please don't hesitate to call.    Sincerely,         Electronically signed by Cortney Shane DO

## 2021-06-20 NOTE — LETTER
Letter by Arlette Doss CHW at      Author: Arlette Doss CHW Service: -- Author Type: --    Filed:  Encounter Date: 6/1/2020 Status: (Other)       CARE COORDINATION  [facility] [address] [place] [address]    June 2, 2020    Javier Paw  [address]  [place] [address]      Dear Javier,  I have been unsuccessful in reaching you since our last contact.   At this time the Care Coordination team will make no further attempts to reach you, however this does not change your ability to continue receiving care from your providers at your primary care clinic.     If you need additional support from a care coordinator in the future, please contact me at [phone].    All of us at [facility] are invested in your health and are here to assist you in meeting your goals.           Sincerely,    PAULINE Cortes  [phone]

## 2021-06-21 NOTE — LETTER
Letter by Arlette Doss CHW at      Author: Arlette Doss CHW Service: -- Author Type: --    Filed:  Encounter Date: 4/22/2021 Status: (Other)       CARE COORDINATION  [facility] [address] [place] [address]    April 22, 2021    Javier Dickson  [address] [place] [address]      Dear Javier,                                                                         You enrolled with the [facility] Care Coordination Services.    In order for us to provide guidance we need to connect on a monthly bases.    We have tried calling you 2 times in the last month and have been unsuccessful in reaching you.     Please call me at [phone] to update on your goals and if you need support and services from Clinic Care Coordination team.    If you reach my voicemail, please leave a message with your daytime telephone number and a date and time that I can return your call.                                                                              Sincerely,            PAULINE Cortes                                                                     Clinic Care Coordination                                          [facility]

## 2021-06-25 NOTE — TELEPHONE ENCOUNTER
Hello,  Can you please reach out to patient to schedule an appointment with PCP for headache/dizziness? Fridays are best.    Thank you!

## 2021-06-25 NOTE — TELEPHONE ENCOUNTER
Spoke with patient used  Language LINE [phone]- Starre pre patient dose not need appointment just help with Citizenship.

## 2021-06-25 NOTE — PROGRESS NOTES
6/3/2021   Clinic Care Coordination Contact    Community Health Worker Follow Up    Goals:   Goals Addressed                 This Visit's Progress       Patient Stated      COMPLETED: Problem Solving (pt-stated)   100%     Goal Statement: I have support from  from [facility] for citizenship process and application.   Date Goal set: 6/3/2021  Barriers: Pamela  Strengths:   Date to Achieve By: 21  Patient expressed understanding of goal: Yes  Personal Plan:   I will wait to hear from immigration office or call to [facility] [phone] if I have questions    [facility] Legal [employer] [facility])-support to apply for Naturalization (N-400)  [address]  [place], [place] [address] [phone]  Fax: [phone]  Intake Line [phone]            [facility] Luiza : Caroline  Called and spoke to patient through Luiza :  and follow up on goal.  Patient reported:   -he has not heard back from the . He called but they don't know him.    Conference call with patient and connected with [facility] [phone] at [facility].  Spoke to [facility] and prompted patient to provide his name and .  Alban updated that he already filed the application with immigration office and waiting to hear back from them.  He will call him if he receives any notifications or letter from immigration office.  Application been filed with immigration office   Patient moved from [place] back to  [facility]  Prompted patient update his new address with [facility] .  New address as of 2021  [address]  [place] [address]    Provided patient number to [facility], address, and name of  if he has any questions about his citizenship application and process    Updated new address in the chart.    Discussed with patient if has any other concerns or needs/ goals.  Patient stated no other concerns just the citizenship.  Explained transition to maintenance  follow up in 2  months.  If patient has any other concerns or needs he will call CHW.    Patient has completed all goals with Clinic Care Coordination.    Routed to [facility] SW to review the chart and confirm if maintenance  is approved.    CHW Follow up: 2 Months    CHW Plan: Discuss graduating from CCC if no other goals or needs    CHW Next Follow Up: 8-3-21

## 2021-06-25 NOTE — PROGRESS NOTES
6/1/2021  Clinic Care Coordination Contact    Community Health Worker Follow Up  Called and spoke to pt briefly.  Stated he is at work unable to talk.  Stated to call him Thursday after 3pm.    CHW will follow up: 6-3-21 after 3pm

## 2021-06-26 NOTE — PROGRESS NOTES
6/3/2021  Clinic Care Coordination Contact  Patient has completed all goals with Clinic Care Coordination.  Please review the chart and confirm if maintenance  is approved.

## 2021-06-26 NOTE — PROGRESS NOTES
"Clinic Care Coordination Contact    Follow Up Progress Note      Assessment: CCC WARREN contacted Javier Dickson with an .    Patient was difficult to track during our conversation as he changed topics a few times and was not always clear on what he was talking about.     He reported housing concerns again, we discussed this yesterday. He wanted SW to help him find new housing. SW started reviewing resources and then he said his sister would help him. SW attempted to confirm what support he wants. He then said he wants SW to get him out of the apartment. It seems like he wanted support to work with his apartment management. WARREN explained there doesn't seem to be a legal reason to break the lease, so SW highly encouraged him to discuss his concerns again with management and discuss the steps to breaking the lease.    Meriisidro Randolph mentioned a few times throughout our conversation if we can't help he would go back to the other clinic that was helping him.    He then asked about citizenship saying he had not heard anything and was upset another family member had already gotten theres in the time he has been waiting. WARREN reiterated he was only referred to [facility] in 12/2019 and shortly after the pandemic started which greatly affected all communication and process. Reminded him this is legal help he is getting. WARREN assured him they will email the  for an update. He is worried because his green card expiring in January 2022.     WARREN received a response from Alban:  \"I will be working on the renew of his card.  He has changed his address with notifying me.  [facility] has denied his fee waiver request and he may need a new medical waiver to refile his N-400 Application.  I am also working on updating his application and I will be sending both of his N-400 and I-90 to him at his new address along with the list of documents that I need from him.  Thank you. \"      Goals addressed this encounter:   Goals Addressed                 " This Visit's Progress      Problem Solving (pt-stated)        Goal Statement: I want to know the status of my citizenship case and next steps within one month  Date Goal set: 06/10/21  Barriers: Language  Strengths:   Date to Achieve By: 7/30/2021  Patient expressed understanding of goal: Yes  Action steps to achieve this goal:  1. I will wait to hear from CCC team or Alban on next steps          Problem Solving (pt-stated)        Goal Statement: I want to find new housing within 1-2 months  Date Goal set: 06/10/21  Barriers: Language, Finances  Strengths:   Date to Achieve By: 8/30/2021  Patient expressed understanding of goal: Yes  Action steps to achieve this goal:  1. I will ask my sister for help finding a new place  2. I will talk to my  about breaking the lease early and the steps to do so  3. I will update CCC team               Intervention/Education provided during outreach: See above     Outreach Frequency: monthly    Plan:   SW will outreach on 6/24 to update about Alban's response
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
## 2021-06-28 NOTE — PROGRESS NOTES
Progress Notes by Charles Washington LGSW at 1/3/2020 10:00 AM     Author: Charles Washington LGSW Service: -- Author Type:     Filed: 1/3/2020 11:33 AM Encounter Date: 1/3/2020 Status: Signed    : Charles Washington LGSW ()       Clinic Care Coordination Contact  SW met with Javier Dicskon, his wife Fabi Dickson, and an  to complete an assessment for enrollment.    ROIs signed for [facility]    New consent to communicate signed    [facility] intake completed    Clinic Care Coordination Contact  OUTREACH    Referral Information:  Referral Source: PCP    Primary Diagnosis: Psychosocial    Chief Complaint   Patient presents with   ? Clinic Care Coordination - Initial   ? Clinic Care Coodination - Face To Face        Universal Utilization:   Clinic Utilization  Difficulty keeping appointments:: No  Compliance Concerns: No  No-Show Concerns: No  No PCP office visit in Past Year: No  Utilization    Last refreshed: 1/3/2020 10:09 AM:  Hospital Admissions 0           Last refreshed: 1/3/2020 10:09 AM:  ED Visits 0           Last refreshed: 1/3/2020 10:09 AM:  No Show Count (past year) 0              Current as of: 1/3/2020 10:09 AM              Clinical Concerns:  Current Medical Concerns:  Hser reported being concerned about his head. He has a neurology appointment on 1/17/2020    Current Behavioral Concerns: None reported    Education Provided to patient: Role of CCC   Pain  Pain (GOAL):: No  Health Maintenance Reviewed: Not assessed  Clinical Pathway: None     Medication Management:  Hser reported he does not take any medications     Functional Status:  Dependent ADLs:: Independent  Dependent IADLs:: Independent  Bed or wheelchair confined:: No  Mobility Status: Independent  Fallen 2 or more times in the past year?: No  Any fall with injury in the past year?: No    Living Situation:  Current living arrangement:: I live in a private home  Type of residence:: Private home - stairs  Hser reported  he lives with 10 people. 6 adults and 4 children. He pays $400 in rent per month  Diet/Exercise/Sleep:  Inadequate nutrition (GOAL):: No  Food Insecurity: No  Tube Feeding: No  Exercise:: Yes  Inadequate activity/exercise (GOAL):: No  Significant changes in sleep pattern (GOAL): No  Hser reported his diet is good, there are sometimes concerns about having enough food. He reported he does physical therapy exercises for his legs and arm. He reported he only sleeps 1-2 hours and on non work days 3-4 hours.   Transportation:  Transportation concerns (GOAL):: No  Transportation means:: Regular car     Psychosocial:  Latter-day or spiritual beliefs that impact treatment:: No  Mental health DX:: No  Mental health management concern (GOAL):: No  Informal Support system:: Family   Hser reported his main stressor is concerns about his head, he cannot remember things and this worries him. No other mental health concerns reported.   Hser would like to become a citizen. SW contacted [facility] and their Luiza speaking employee, Yaneth, completed an intake with him.  Financial/Insurance:   Financial/Insurance concerns (GOAL):: No  Hser is working 40 hours per week, $13 per hour at [employer].     Resources and Interventions:  Current Resources:    ;   Community Resources: None  Supplies used at home:: None  Equipment Currently Used at Home: none    Advance Care Plan/Directive  Advanced Care Plans/Directives on file:: No    Referrals Placed: None     Goals:   Goals        General    Medical (pt-stated)     Notes - Note created  1/3/2020 11:17 AM by Charles Washington LGSW    Goal Statement- I want to address my medical concerns within one month    Action steps to achieve this goal  1.  I will attend my neurology appointment on 1/17/2020  2.  I will share the results of my appointment with my PCP and CCC team      Date goal set:  1/3/2020 BC          Psychosocial (pt-stated)     Notes - Note created  1/3/2020 11:17 AM by Felix  Charles QUICK, [facility]    Goal Statement- I want to work with an agency to assist me with citizenship within 1-2 months    Action steps to achieve this goal  1.  I will wait for [facility] to contact me  2.  I will update CCC team with the status of my case      Date goal set:  1/3/2020 BC                Barriers: Memory Concerns, Language  Strengths: Family support  Patient/Caregiver understanding: Reported understanding           Plan: Standard Outreach

## 2021-06-29 NOTE — PROGRESS NOTES
Progress Notes by Charles Washington LGSW at 10/8/2020  2:00 PM     Author: Charles Washington LGSW Service: -- Author Type:     Filed: 10/8/2020  1:37 PM Encounter Date: 10/8/2020 Status: Signed    : Charles Washington LGSW ()       Clinic Care Coordination Contact  CCC WARREN contacted er Paw with an  to complete a new assessment for re-enrollment into [facility].      WARREN completed FRW referral to assist with MA and SNAP, along with address update.    WARREN sent Debratheyudy an email to ask about citizenship status.       Clinic Care Coordination Contact  OUTREACH    Referral Information:  Referral Source: Self-patient/Caregiver    Primary Diagnosis: Psychosocial    Chief Complaint   Patient presents with   ? Clinic Care Coordination - Initial        Universal Utilization:   Clinic Utilization  Difficulty keeping appointments:: No  Compliance Concerns: No  No-Show Concerns: No  No PCP office visit in Past Year: No  Utilization    Last refreshed: 10/6/2020 11:39 AM: Hospital Admissions 0           Last refreshed: 10/6/2020 11:39 AM: ED Visits 0           Last refreshed: 10/6/2020 11:39 AM: No Show Count (past year) 0              Current as of: 10/6/2020 11:39 AM              Clinical Concerns:  Current Medical Concerns:  No concerns reported    Current Behavioral Concerns: No concerns reported    Education Provided to patient: Role of CCC   Pain  Pain (GOAL):: No  Health Maintenance Reviewed: Not assessed  Clinical Pathway: None     Medication Management:  No concerns reported     Functional Status:  Bed or wheelchair confined:: No  Mobility Status: Independent  Fallen 2 or more times in the past year?: No  Any fall with injury in the past year?: No    Living Situation:  Current living arrangement:: I live in a private home with family  Type of residence:: Other(Mobile home)    Lifestyle & Psychosocial Needs:        Diet:: Regular(Diet/Appetite: Good, Have enough food to  eat,)  Inadequate nutrition (GOAL):: No  Tube Feeding: No  Inadequate activity/exercise (GOAL):: No  Significant changes in sleep pattern (GOAL): No  Transportation means:: Regular car     Anabaptism or spiritual beliefs that impact treatment:: No  Mental health DX:: No  Mental health management concern (GOAL):: No  Informal Support system:: Family, Spouse   Socioeconomic History   ? Marital status: Single     Spouse name: Not on file   ? Number of children: Not on file   ? Years of education: Not on file   ? Highest education level: Not on file     Tobacco Use   ? Smoking status: Never Smoker   ? Smokeless tobacco: Never Used                Resources and Interventions:  Current Resources:      Community Resources: None  Supplies Currently Used at Home: None  Equipment Currently Used at Home: none    Advance Care Plan/Directive  Advanced Care Plans/Directives on file:: No          Goals:   Goals        General    Financial Wellbeing (pt-stated)     Notes - Note created  10/8/2020  1:19 PM by Charles Washington LGSW    Goal Statement: I want to update my family and my insurance/snap information within one month  Date Goal set: 10/08/20  Barriers: Language, Moved  Strengths:   Date to Achieve By: 11/30/2020  Patient expressed understanding of goal: Yes  Action steps to achieve this goal:  1. I will work with FRW to update my address and apply for any necessary services.  2. I will update CCC team        Problem Solving (pt-stated)     Notes - Note created  10/8/2020  1:20 PM by Charles Washington LGSW    Goal Statement: I want to know the status of my citizenship application within one month  Date Goal set: 10/08/20  Barriers: Langauge  Strengths:   Date to Achieve By: 11/30/2020  Patient expressed understanding of goal: Yes  Action steps to achieve this goal:  1.  will request update from [facility]              Patient/Caregiver understanding: Reported understanding        Future Appointments              Today [facility]  ALOK KELLEY [facility]          Plan: Standard Outreach

## 2021-07-05 ENCOUNTER — COMMUNICATION - HEALTHEAST (OUTPATIENT)
Dept: FAMILY MEDICINE | Facility: CLINIC | Age: 31
End: 2021-07-05

## 2021-07-05 NOTE — TELEPHONE ENCOUNTER
Telephone Encounter by Liz Nguyễn at 7/5/2021  3:44 PM     Author: Liz Nguyễn Service: -- Author Type: Patient Access    Filed: 7/5/2021  3:49 PM Encounter Date: 7/5/2021 Status: Signed    : Liz Nguyễn (Patient Access)       New Appointment Needed  What is the reason for the visit:    caller needs help filling out some forms for citizenship and he does not know how.  He wants to know if can set up appt with a . It looks like he has seen one before looking at his appt. History, however, I do not schedule for social workers at clinic.  Is this something they could help him with at an acual appointment with someone who does citizenship appts?  Please call to schedule. Thanks.   Provider Preference: He requested , Maybe just needs a citizenship appointment. Needs Luiza   How soon do you need to be seen?: ASAP-Patient said if at all possible this Thursday, 7/8  anytime     Waitlist offered?: N/A  Okay to leave a detailed message:  Yes in Luiza

## 2021-07-06 NOTE — TELEPHONE ENCOUNTER
Telephone Encounter by Margie Edwards MA at 7/6/2021  1:57 PM     Author: Margie Edwards MA Service: -- Author Type: Medical Assistant    Filed: 7/6/2021  1:58 PM Encounter Date: 7/5/2021 Status: Signed    : Margie Edwards MA (Medical Assistant)       Are you able to help schedule this pt to see you to help with citizenship paperwork?

## 2021-08-04 ENCOUNTER — PATIENT OUTREACH (OUTPATIENT)
Dept: NURSING | Facility: CLINIC | Age: 31
End: 2021-08-04

## 2021-08-04 NOTE — PROGRESS NOTES
Clinic Care Coordination Contact  Care Team Conversations    CCC SW attempted to reach Hser Paw with an , phone goes right to a message saying he doesn't have a voicemail setup.

## 2021-08-12 ENCOUNTER — PATIENT OUTREACH (OUTPATIENT)
Dept: CARE COORDINATION | Facility: CLINIC | Age: 31
End: 2021-08-12

## 2021-08-12 NOTE — PROGRESS NOTES
8/12/2021  Clinic Care Coordination Contact  [facility]/Xavier Jarrett : Adilene Oakes ID#[id number]    Clinical Data: Care Coordinator Outreach  Outreach attempted x 1.  Phone is restricted.  Father's mobile not inservice.  Unable to leave message on patient's voicemail with call back information and requested return call.  Plan: Care Coordinatorwill try to reach patient again in 3 weeks or wait until phone is valid.  CHW off on 8-16 -21 to 8-27-21 deferred next follow up     CHW Next Follow Up: 9-7-21

## 2021-09-07 ENCOUNTER — PATIENT OUTREACH (OUTPATIENT)
Dept: CARE COORDINATION | Facility: CLINIC | Age: 31
End: 2021-09-07

## 2021-09-08 ENCOUNTER — PATIENT OUTREACH (OUTPATIENT)
Dept: NURSING | Facility: CLINIC | Age: 31
End: 2021-09-08

## 2021-09-08 NOTE — PROGRESS NOTES
9/8/2021  Clinic Care Coordination Contact    Community Health Worker Follow Up    Care Gaps:     Health Maintenance Due   Topic Date Due     PREVENTIVE CARE VISIT  Never done     ADVANCE CARE PLANNING  Never done     DEPRESSION ACTION PLAN  Never done     COVID-19 Vaccine (1) Never done     HIV SCREENING  Never done     HEPATITIS C SCREENING  Never done     PHQ-9  07/17/2020     DTAP/TDAP/TD IMMUNIZATION (4 - Td or Tdap) 12/30/2020     INFLUENZA VACCINE (1) 09/01/2021       Care Gaps Last addressed on 9-8-21    Goals:   Goals Addressed as of 9/8/2021 at 11:39 AM                    Today       Problem Solving (pt-stated)   70%    Added 8/4/21 by Charles Washington, [facility]      Goal Statement: I want to know the status of my citizenship case and next steps within one month   Date Goal set: 06/10/21   Barriers: Language   Strengths:   Date to Achieve By: 7/30/2021   Patient expressed understanding of goal: Yes   Action steps to achieve this goal:   1.Wife will drop off the letter/form at the clinic tomorrow 9-9-21 for CC WARREN to review and support   2. I will talk to CCC SW on 9-16-21 at 3pm regarding the form/letter.  3. I will update [facility] Team at next outreach.     Updated: 9-8-21 AL            Intervention and Education during outreach:   [facility] leslie  : Caroline Joyce  Consent to communicate on file to communicate with wife Fabi Dickson.    Spoke to patient's wife Fabi Dickson. Stated patient is at work best to call back at 3pm.  She reported husban/patient has new number [phone].  She received the letter from the  at [facility] and that she mailed everything out to the  and then she received a letter letter/form from [facility].  She will drop off the letter/form tomorrow 9-9-21 at the clinic to WARREN Gómez.    Scheduled appt with with CC SW 9-16-21 at 3 pm for support with citizenship and review form/letter.    Wife stated they don't need to find new housing anymore. Deleted goal    Wife requested help to  get a letter from the doctor to get the baby social security card and that she needs the doctor to write a  note/letter attached with birth certificate.  Need letter from doctor to show proof what hospital baby born at   She lost foot print paper from the hospital.  Baby name Elizabeth Herrera  Scheduled appt with BLAYNE KELLEY on 9-16-21 at 2:30pm for support     [facility] WARREN follow up 9-16-21 at 3pm  CHW Follow up: Monthly    CHW Plan: Follow up on goals    CHW Next Follow Up: 10-18-21

## 2021-09-08 NOTE — PROGRESS NOTES
9/7/2021  Clinic Care Coordination Contact  [facility]/Xavier Jarrett : Stiven ID#[id number]    Clinical Data: Care Coordinator Outreach  Outreach attempted x 2.  No answer. VM not set up yet   Unable to leave message on patient's voicemail with call back information and requested return call.    Plan: Care Coordinator will send unable to contact letter with care coordinator contact information via mail. Care Coordinator will try to reach patient again in 1 month.        CHW Follow up: Monthly    CHW Plan: Follow up on goals    CHW Next Follow Up: 10-6-21

## 2021-09-16 ENCOUNTER — PATIENT OUTREACH (OUTPATIENT)
Dept: CARE COORDINATION | Facility: CLINIC | Age: 31
End: 2021-09-16

## 2021-09-16 NOTE — PROGRESS NOTES
CCC SW attempted to contact Fabi Dickson and Javier Dickson with an .  tried the numbers and the phone would not connect with either number.

## 2021-09-17 ENCOUNTER — PATIENT OUTREACH (OUTPATIENT)
Dept: NURSING | Facility: CLINIC | Age: 31
End: 2021-09-17

## 2021-09-17 NOTE — PROGRESS NOTES
9/17/2021  Clinic Care Coordination Contact    Community Health Worker Follow Up    Care Gaps:     Health Maintenance Due   Topic Date Due     PREVENTIVE CARE VISIT  Never done     ADVANCE CARE PLANNING  Never done     DEPRESSION ACTION PLAN  Never done     COVID-19 Vaccine (1) Never done     HIV SCREENING  Never done     HEPATITIS C SCREENING  Never done     PHQ-9  07/17/2020     DTAP/TDAP/TD IMMUNIZATION (4 - Td or Tdap) 12/30/2020     INFLUENZA VACCINE (1) 09/01/2021       Care Gaps Last addressed on 8-17-21 discuss at next office visit    Goals:   Goals Addressed as of 9/17/2021 at 12:08 PM                    Today    9/8/21       Problem Solving (pt-stated)   70%  70%    Added 8/4/21 by Charles Washington, [facility]      Goal Statement: I want to know the status of my citizenship case and next steps within one month   Date Goal set: 06/10/21   Barriers: Language   Strengths:   Date to Achieve By: 7/30/2021   Patient expressed understanding of goal: Yes   Action steps to achieve this goal:   1. Wife dropped off the letter/form at the clinic 9-9-21 for CC SW to review and support   2. I will talk to CCC SW on 9-24-21 at 9am regarding the form/letter.  3. I will update CCC Team at next outreach.     Updated: 9-17-21 AL            Intervention and Education during outreach:     Luiza  Horacio Day ID#[id number]    Received call from patient and wife Randolph Fabi that they did not get any calls from the SW.  Stated they have new cell phone number.   Patient's new cell [phone]  Wife's new cell [phone]  Updated in Chart    Does not want to find housing until next year.  Goal deleted    Best time day to schedule or call him is Friday.  Rescheduled appt with CC SW to 9-24-21 at 9:30am to review citizenship for/letter       CC SW follow up 9-24-21 at 9:30am   CHW Follow up: Monthly    CHW Plan: Follow up on goals    CHW Next Follow Up: 10-25-21                   New phone number   Next year  Only need help with  citizenship

## 2021-09-24 ENCOUNTER — PATIENT OUTREACH (OUTPATIENT)
Dept: NURSING | Facility: CLINIC | Age: 31
End: 2021-09-24

## 2021-09-24 NOTE — PROGRESS NOTES
Clinic Care Coordination Contact    Follow Up Progress Note      Assessment: CCC SW contacted Javier Dickson with an . SW reviewed that they only have a letter from [facility] that was sent in July. This letter was requesting documents be sent back to [facility]. Javier Dickson reported he sent paperwork to [facility] team. WARREN is not aware of any other paperwork. WARREN sent [facility] a message for an update.    Care Gaps:    Health Maintenance Due   Topic Date Due     PREVENTIVE CARE VISIT  Never done     ADVANCE CARE PLANNING  Never done     DEPRESSION ACTION PLAN  Never done     COVID-19 Vaccine (1) Never done     HIV SCREENING  Never done     HEPATITIS C SCREENING  Never done     PHQ-9  07/17/2020     DTAP/TDAP/TD IMMUNIZATION (4 - Td or Tdap) 12/30/2020     INFLUENZA VACCINE (1) 09/01/2021         Goals addressed this encounter:   Goals Addressed                    This Visit's Progress       Problem Solving (pt-stated)         Goal Statement: I want to know the status of my citizenship case and next steps within one month   Date Goal set: 06/10/21   Barriers: Language   Strengths:   Date to Achieve By: 7/30/2021   Patient expressed understanding of goal: Yes   Action steps to achieve this goal:   1. WARREN waiting for update from Alban at [facility]            Intervention/Education provided during outreach: See above          Plan:   WARREN waiting to hear from [facility]

## 2021-10-05 ENCOUNTER — PATIENT OUTREACH (OUTPATIENT)
Dept: NURSING | Facility: CLINIC | Age: 31
End: 2021-10-05

## 2021-10-12 ENCOUNTER — PATIENT OUTREACH (OUTPATIENT)
Dept: NURSING | Facility: CLINIC | Age: 31
End: 2021-10-12

## 2021-10-12 NOTE — PROGRESS NOTES
Clinic Care Coordination Contact  Care Team Conversations    CCC SW attempted to reach patient with an  twice, the phone would not ring.     SW recommending another standard outreach due to phone troubles.

## 2021-10-25 ENCOUNTER — PATIENT OUTREACH (OUTPATIENT)
Dept: CARE COORDINATION | Facility: CLINIC | Age: 31
End: 2021-10-25

## 2021-10-25 NOTE — PROGRESS NOTES
10/25/2021    Clinic Care Coordination Contact  [facility]/Xavier Jarrett : ID#[id number]     Clinical Data: Care Coordinator Outreach  Outreach attempted x 1.  Called both contact numbers.  No answer. No voicemail available unable leave message on patient's mother voicemail with call back information and requested return call.  Plan: Care Coordinator will try to reach patient;s mother again in 10 business days.     CHW Next Follow Up: 11-8-21

## 2021-11-08 ENCOUNTER — PATIENT OUTREACH (OUTPATIENT)
Dept: CARE COORDINATION | Facility: CLINIC | Age: 31
End: 2021-11-08
Payer: COMMERCIAL

## 2021-11-08 NOTE — PROGRESS NOTES
11/8/2021    Clinic Care Coordination Contact  [facility]    Clinical Data: Care Coordinator Outreach  Outreach attempted x 2.  Luiza  left message on patient voicemail with call back information and requested return call.  Plan: Care Coordinator will send unable to contact letter with care coordinator contact information via mail.     Care Coordinator will try to reach patient again in 1 month.      CHW Follow up: Monthly    CHW Plan: Follow up on goals    CHW Next Follow Up: 12-8-21

## 2021-11-08 NOTE — LETTER
[facility]  [address] [place] [address]    November 8, 2021    Javier Paw  [address]  [place] [address]      Dear Javier,    I have been attempting to reach you since our last contact. I would like to continue to work with you and provide any additional support you may need on achieving your health care related goals.   I would appreciate if you would give me a call at [phone] to let me know if you would like to continue working together. I know that there are many things that can affect our ability to communicate and I hope we can continue to work together.    All of us at the [facility].are invested in your health and are here to assist you in meeting your goals.     Sincerely,    Arlette Doss  Community Health Worker  [facility] Care Coordination  [email or web address]  [email or web address]   Office: [phone]  Fax: [phone]

## 2021-11-12 ENCOUNTER — PATIENT OUTREACH (OUTPATIENT)
Dept: CARE COORDINATION | Facility: CLINIC | Age: 31
End: 2021-11-12
Payer: COMMERCIAL

## 2021-11-12 ENCOUNTER — TELEPHONE (OUTPATIENT)
Dept: FAMILY MEDICINE | Facility: CLINIC | Age: 31
End: 2021-11-12
Payer: COMMERCIAL

## 2021-11-12 NOTE — TELEPHONE ENCOUNTER
Called patient per request of Nadeen Mccallum, employee in the [facility] Early Education program with [employer]. She is working with patient's 2 year old daughter Sherry Dickson, and patient had asked her to help connect to [facility] for legal help with citizenship.     Called patient using [employer] . He informed that his green card/permanent resident card expires on 1/5/2022 and that he needs help immediately to renew it. They arrived here in 2009 with refugee resettlement status. Had worked with someone at this clinic ([facility]) several years ago. Changed to [facility] in Jan 2020 due to insurance change, and asked for legal help and they connected him but then he did not hear back for 5 months and no progress was made. He is getting nervous since it is a very short timeline until his card expires.     SW put in legal referral, and consulted with Melissa Bah with [facility] who works at [facility]. She is willing to take this case, but it is short notice.     Consulted with WARREN Shelton Care Coordinator at [facility], who informs that they are working with patient but have been unable to reach him recently. Informed that he is working with Alban Bergeron at [facility] for his citizenship case but that patient has been hard to reach due to moving to [place] earlier this year.     This SW sent email to Alban at [facility] providing best phone number and time to reach patient, and requesting that Alban call patient. Did not complete legal referral to Melissa Bah.     Nadeen Goddard, ELHAM

## 2021-11-12 NOTE — PROGRESS NOTES
Clinic Care Coordination Contact    Situation: Patient chart reviewed by carecoordinator.    Background: SW completed chart review    Assessment: Patient has been unable to be reached by CHW    Plan/Recommendations: Standard Outreach, one more attempt.

## 2021-11-15 NOTE — TELEPHONE ENCOUNTER
11/15/21:   Email response from Alban Bergeron,  with [facility]:     Thank you for your email.   Thank you so much for email.  I sent the fee waiver request Form to him twice for him to review, sign and return it to me so that I can file his N-400 application, but he has never responded.  I contacted [facility] for assistance, and they have been having a hard time to reach him.  I would like to file his N-400 before his card expires.  I will try to call him Monday.      ELHAM Sommer

## 2021-11-18 NOTE — TELEPHONE ENCOUNTER
Eulogio Senior. Pt stopped by at [facility]  inquiring about assistance in helping him apply for citizenship. Pt say someone was supposed to call him back, but he haven't got that call yet. I see on this note thread that you guys have been trying to contact pt, but with no success. I explained situation to pt and got an updated ph# on file.     Please call pt back at ph# [phone]. Pt is available  every Friday as he is off from work and any time is ok. Monday thru Wednesday pt is at work til 6pm ok to leave voicemail, but do not preferred call back on these days. On Thursday pt work til noon, so anytime after noon is ok to call on that day.     Please follow back up with pt at your earliest convenient as pt is very concern about his citizenship expiring. Thank you.

## 2021-11-22 ENCOUNTER — TELEPHONE (OUTPATIENT)
Dept: FAMILY MEDICINE | Facility: CLINIC | Age: 31
End: 2021-11-22
Payer: COMMERCIAL

## 2021-11-22 NOTE — TELEPHONE ENCOUNTER
Follow up from care coordination telephone chain from 11/12 note. Patient had come into [facility] needing help with legal and stating he never got a call back.   WARREN called patient with [facility] . No answer as patient works Mon-Thurs and cannot answer. Left VM informing him to expect a call from Alban Bergeron, [facility]  who has been helping him with citizenship case. Emailed Alban ([email or web address]) with the updated phone number for patient.

## 2021-12-09 ENCOUNTER — PATIENT OUTREACH (OUTPATIENT)
Dept: CARE COORDINATION | Facility: CLINIC | Age: 31
End: 2021-12-09
Payer: COMMERCIAL

## 2021-12-10 NOTE — PROGRESS NOTES
"2021  Clinic Care Coordination Contact  [facility]/Xavier Jarrett : [place] [id number]     Clinical Data: Care Coordinator Outreach  Outreach attempted x 3. Phone not working \" sorry your call can't be completed at this time.\"  Plan: Care Coordinator will route to CC  to review chart before sending disenrollment letter with care coordinator contact information via mail.    Care Coordinator will wait to hear from [facility] of next step.  CHW follow  21       Patient into the clinic on 21  Message from Marleni Will     [Wednesday 3:25 PM] Marleni Will  i have pt with mrn# [id number]    here in front of me with a message from his  stating pt never sent his mother date of becoming citizen,  of youngest child, review & sign the fee waiver request form.     please call pt per pt, pt stated pt did receive letter from  and came into clinic to drop them off to you.     per pt, his alien card will  on 22    pt is only available on thursday afternoon, Friday 1pm\"          "

## 2021-12-17 ENCOUNTER — PATIENT OUTREACH (OUTPATIENT)
Dept: NURSING | Facility: CLINIC | Age: 31
End: 2021-12-17
Payer: COMMERCIAL

## 2021-12-17 NOTE — PROGRESS NOTES
Clinic Care Coordination Contact    Follow Up Progress Note      Assessment: CCC SW contacted Javier Dickson with an . He reported he dropped paperwork off at [facility] more than one month ago to be sent to his , but his  doesn't have it. WARREN informed him SW will contact  and check the status of this.    It is unclear what paperwork he dropped off. He also went to [facility] regarding citizenship support.     SW will follow up with him after hearing from .     Care Gaps:    Health Maintenance Due   Topic Date Due     PREVENTIVE CARE VISIT  Never done     ADVANCE CARE PLANNING  Never done     DEPRESSION ACTION PLAN  Never done     COVID-19 Vaccine (1) Never done     HIV SCREENING  Never done     HEPATITIS C SCREENING  Never done     PHQ-9  07/17/2020     DTAP/TDAP/TD IMMUNIZATION (4 - Td or Tdap) 12/30/2020     INFLUENZA VACCINE (1) 09/01/2021       Deferred addressing care gaps due to SW follow up to discuss goal.      Goals addressed this encounter:   Goals Addressed                    This Visit's Progress       Problem Solving (pt-stated)   70%      Goal Statement: I want to know the status of my citizenship case and next steps within one month   Date Goal set: 06/10/21   Barriers: Language   Strengths:   Date to Achieve By: 7/30/2021   Patient expressed understanding of goal: Yes   Action steps to achieve this goal:   1. WARREN waiting for update from Alban at [facility]            Intervention/Education provided during outreach: See above     Outreach Frequency: monthly    Plan:   SW will wait to hear from [facility]

## 2021-12-21 ENCOUNTER — PATIENT OUTREACH (OUTPATIENT)
Dept: CARE COORDINATION | Facility: CLINIC | Age: 31
End: 2021-12-21

## 2021-12-21 ENCOUNTER — PATIENT OUTREACH (OUTPATIENT)
Dept: NURSING | Facility: CLINIC | Age: 31
End: 2021-12-21
Payer: COMMERCIAL

## 2021-12-21 NOTE — PROGRESS NOTES
12/21/2021  Clinic Care Coordination Contact    Community Health Worker Follow Up    Care Gaps:     Health Maintenance Due   Topic Date Due     PREVENTIVE CARE VISIT  Never done     ADVANCE CARE PLANNING  Never done     DEPRESSION ACTION PLAN  Never done     COVID-19 Vaccine (1) Never done     HIV SCREENING  Never done     HEPATITIS C SCREENING  Never done     PHQ-9  07/17/2020     DTAP/TDAP/TD IMMUNIZATION (4 - Td or Tdap) 12/30/2020     INFLUENZA VACCINE (1) 09/01/2021       Care Gaps Last addressed on 12-21-21 did not discuss care gaps.    Goals:   Goals Addressed as of 12/21/2021 at 4:58 PM                    Today    12/17/21       Problem Solving (pt-stated)   80%  70%    Added 8/4/21 by Charles Washington, [facility]      Goal Statement: I want to know the next steps and process with my citizenship case  within 1-2 months   Date Goal set: 06/10/21 Updated 12-21-21  Barriers: Language   Strengths: [facility] for support with citizenship, CC team   Date to Achieve By: 2-2022  Patient expressed understanding of goal: Yes   Action steps to achieve this goal:   1. I will wait for CC SW to call back for update from Alban at [facility]    Updated: 12-21-21 AL             Intervention and Education during outreach:     Luiza : Mervin iD#[id number]  Called and spoke to patient through Luiza :        and follow up on goal.  Patient reported:   - dropped off some document last Friday for CC SW.  Spoke to CC SW last Friday already  Waiting for CC SW to call back to update on citizenship process and ext steps.      CHW Follow up: Monthly    CHW Plan: Follow up on goal    CHW Next Follow Up: 1-28-22

## 2021-12-22 NOTE — PROGRESS NOTES
12/21/2021  Clinic Care Coordination Contact  [facility]/Xavier Jarrett : Mervin ID # [id number]     Clinical Data: Care Coordinator Outreach  Outreach attempted x 1.  Left message on patient's voicemail with call back information and requested return call.  Plan: Care Coordinator will try to reach patient again in 10 business days.    CHW Follow up 1-4-22

## 2022-01-07 ENCOUNTER — PATIENT OUTREACH (OUTPATIENT)
Dept: NURSING | Facility: CLINIC | Age: 32
End: 2022-01-07

## 2022-01-07 NOTE — PROGRESS NOTES
Clinic Care Coordination Contact  Care Team Conversations    CCC WARREN spoke with Javier Dickson with an . He hasn't heard from his  yet. He is worried his green card  and isn't sure if he can still file taxes. WARREN had messaged [place], waiting for a response.

## 2022-01-28 ENCOUNTER — PATIENT OUTREACH (OUTPATIENT)
Dept: NURSING | Facility: CLINIC | Age: 32
End: 2022-01-28

## 2022-01-28 NOTE — PROGRESS NOTES
1/28/2022   Clinic Care Coordination Contact    Community Health Worker Follow Up    Care Gaps:     Health Maintenance Due   Topic Date Due     PREVENTIVE CARE VISIT  Never done     ADVANCE CARE PLANNING  Never done     DEPRESSION ACTION PLAN  Never done     COVID-19 Vaccine (1) Never done     HIV SCREENING  Never done     HEPATITIS C SCREENING  Never done     PHQ-9  07/17/2020     DTAP/TDAP/TD IMMUNIZATION (4 - Td or Tdap) 12/30/2020     INFLUENZA VACCINE (1) 09/01/2021       Care Gap Goal set: No    Goals:   Goals Addressed as of 2/1/2022 at 4:38 PM                    1/28/22 12/21/21       Problem Solving (pt-stated)   80%  80%    Added 8/4/21 by Charles Washington, [facility]      Goal Statement: I want to know the next steps and process with my citizenship case  within 1-2 months   Date Goal set: 06/10/21 Updated 12-21-21  Barriers: Language   Strengths: [facility] for support with citizenship, CC team   Date to Achieve By: 2-2022  Patient expressed understanding of goal: Yes   Action steps to achieve this goal:   1. I will wait for CC SW on 2-11-22 at 11am for update from Alban at [facility]    Updated: 1-28-22 AL            Intervention and Education during outreach:   Luiza :  Saloni ID#  #[id number]     Called and spoke to patient asn patient's wife through Luiza   and follow up on goal.  Patient reported:   -no updated from [facility] about citizenship     appt with CC SW 2-11-22 at 11am re assessment and update on citizenship from [facility]      CHW Follow up: Monthly    CHW Plan: Follow up on goal/ transition to mainteance  CHW Next Follow Up: 3-11-22

## 2022-02-11 ENCOUNTER — PATIENT OUTREACH (OUTPATIENT)
Dept: NURSING | Facility: CLINIC | Age: 32
End: 2022-02-11

## 2022-02-11 ASSESSMENT — ACTIVITIES OF DAILY LIVING (ADL): DEPENDENT_IADLS:: INDEPENDENT

## 2022-02-11 NOTE — PROGRESS NOTES
Clinic Care Coordination Contact    Follow Up Progress Note      Assessment: CCC SW completed an updated assessment. Patient doing well. He reported he has now paid a  to help him with citizenship. Goal completed, patient moved to maintenance.     Care Gaps:    Health Maintenance Due   Topic Date Due     PREVENTIVE CARE VISIT  Never done     ADVANCE CARE PLANNING  Never done     DEPRESSION ACTION PLAN  Never done     COVID-19 Vaccine (1) Never done     HIV SCREENING  Never done     HEPATITIS C SCREENING  Never done     PHQ-9  07/17/2020     DTAP/TDAP/TD IMMUNIZATION (4 - Td or Tdap) 12/30/2020     INFLUENZA VACCINE (1) 09/01/2021       Deferred addressing care gaps due to SW follow up to discuss goal.      Goals addressed this encounter:   Goals Addressed                    This Visit's Progress       COMPLETED: Problem Solving (pt-stated)         I have paid an  to help with my citizenship            Intervention/Education provided during outreach: See above     Outreach Frequency: 2 months    Plan:   Standard outreach

## 2022-02-11 NOTE — PROGRESS NOTES
2/11/2022  Clinic Care Coordination Contact  Care Team Conversations    Received message from CC WARREN that patient transition to maintenance as of  2-11-22  CHW to follow up in 2 months.    CHW Follow up: 2 months  CHW Plan: Discuss graduating from CCC if no other goals or needs from [facility] team    CHW Next Follow Up: 4-8-22

## 2022-03-29 ENCOUNTER — PATIENT OUTREACH (OUTPATIENT)
Dept: CARE COORDINATION | Facility: CLINIC | Age: 32
End: 2022-03-29

## 2022-04-08 ENCOUNTER — PATIENT OUTREACH (OUTPATIENT)
Dept: NURSING | Facility: CLINIC | Age: 32
End: 2022-04-08

## 2022-04-08 NOTE — PROGRESS NOTES
4/8/2022  Clinic Care Coordination Contact  [facility]/Xavier Jarrett : Adilene Guallpa Paw ID [id number]     Clinical Data: Care Coordinator Outreach  Outreach attempted x 2.  Luiza  left message on patient's voicemail with call back information and requested return call.    Plan: Care Coordinator will route to CC SW to review chart before sending disenrollment letter with care coordinator contact information via mail.     Care Coordinator will wait for CC  to respond by 4-22-22    Arlette Doss  Community Health Worker  [facility] Care Coordination  [email or web address]   Office: [phone]  Fax: [phone]

## 2022-04-08 NOTE — PROGRESS NOTES
4/8/2022  Clinic Care Coordination Contact  Care Team Conversations    Routed to CC CCSW  Please review chart  2nd unsuccessful attempts  Please advise    Patient in Maintenance

## 2022-04-13 ENCOUNTER — PATIENT OUTREACH (OUTPATIENT)
Dept: CARE COORDINATION | Facility: CLINIC | Age: 32
End: 2022-04-13

## 2023-01-09 ENCOUNTER — TELEPHONE (OUTPATIENT)
Dept: FAMILY MEDICINE | Facility: CLINIC | Age: 33
End: 2023-01-09

## 2023-01-09 NOTE — LETTER
January 9, 2023      Merier Paw  [address]  [place] [address]        Dear Javier,      Your  would like you to know that you have an upcoming interview for citizenshipwith USCIS on January 17, 2023, at 3:00pm. He needs to meet with you before this to review your application to help prepare you for this interview.  Please give him a call as soon as possible. Here is his information:         Alban Bergeron  Accredited Representative  Immigrant Legal Services  [facility] Legal Services                       -  [address], [address]  [place] [address]   Office Phone:  [phone]  Office Fax:  [phone]    Please call  Alban Bergeron as soon as possible. If you call and state your name, Alban will return your call with an .         Sincerely,        Nadeen Goddard  [facility]  [phone]

## 2023-01-09 NOTE — TELEPHONE ENCOUNTER
[facility] Legal Case Update:      Contact from  Alban Bergeron with [facility], who requested outreach to patient to inform of upcoming interview with USCIS on January 17, 2023, at 3:00pm and the need to meet and review his application to help prepare him for this interview.  Requesting that patient call  as soon as possible. Can be reached at:     Alban Bergeron  Accredited Representative  Immigrant Legal Services  [facility]                       -  [address], [address]  [place] [address]   Office Phone:  [phone]  Office Fax:  [phone]    WARREN called patient with Luiza . No answer on home phone ([phone]) and VM box not set up.   Called spouse Fabi Dickson ([phone]), no answer and VM box not set up.     Sent patient a letter requesting that he give  a call to prepare for the interview.     Emailed Alban back with update. Also informed that patient does not get primary care at [facility], but attends [facility] and PCP is Dr. Cortney Shane.     Nadeen Goddard, [facility]

## 2023-01-27 ENCOUNTER — TELEPHONE (OUTPATIENT)
Dept: FAMILY MEDICINE | Facility: CLINIC | Age: 33
End: 2023-01-27

## 2023-01-27 NOTE — TELEPHONE ENCOUNTER
[10:12 AM] Radha Johnson Hser  Male, 32 year old, 1990  MRN:   [id number]  Good Morning i have this patient here at the [facility] and it looks like you have been trying to get a hold of him he would like you to give him a call his new phone number is [phone]

## 2023-01-30 ENCOUNTER — PATIENT OUTREACH (OUTPATIENT)
Dept: CARE COORDINATION | Facility: CLINIC | Age: 33
End: 2023-01-30

## 2023-01-30 NOTE — PROGRESS NOTES
"1/30/2023  Clinic Care Coordination Contact  Care Team Conversations    Received message  staff   \"Radha Johnson Hser Male, 32 year old, 1990  MRN: [id number]  Good Morning i have this patient here at the [facility] and it looks like you have been trying to get a hold of him he would like you to give him a call his new phone number is [phone].\"    CHW updated phone in patient's chart demographics.    Patient graduated from [facility] [employer] 4-13-22    Luiza : Kel Gonsales ID#[id number]  Called and spoke to patient and follow up on message from clinic  Patient stated he applied for citizenship and that he did not get a same .  Suggested patient to talk to his  about the citizenship since he paid for the .  He hired an  for the citizenship.  A /worker a hired an  and the  hired the  for the citizenship.  CHW unclear of what patient asking and what he needs help with .  Stated he has a .  Requested to provide  contact number. Stated he does not know the  number.  Requested CHW to talk to the .   contact # [phone]    Conference call with patient and called to [facility] office [phone] and left VM with Alban Bergeron to call patient back at [phone].  Suggested patient to wait for the  Alban Bergeron to call him back regarding his citizenship process.    Explained to patient that he has as  from [facility] named Alban Bergeron that is helping him with the citizenship process and application  Suggested patient to wait to hear back from him regarding his citizenship.    CCC has support from  at [facility] with citizenship process and application.    Arlette Doss  Community Health Worker  [facility] Care Coordination  [email or web address]  [email or web address]   Office: [phone]  Fax: [phone]                   "

## 2023-02-01 ENCOUNTER — PATIENT OUTREACH (OUTPATIENT)
Dept: CARE COORDINATION | Facility: CLINIC | Age: 33
End: 2023-02-01

## 2023-02-01 NOTE — PROGRESS NOTES
2/1/2023  Clinic Care Coordination Contact  Community Health Worker Initial Outreach    CHW Initial Information Gathering:  Referral Source: Self-patient/Caregiver  Preferred Hospital: [facility]  [phone]  Preferred Urgent Care: [facility], [phone]  Current living arrangement:: I live in a private home with family, I live in a private home with spouse  Type of residence:: Apartment  Community Resources: None  Supplies Currently Used at Home: None  Equipment Currently Used at Home: none  Informal Support system:: Spouse, Family  No PCP office visit in Past Year: No  Transportation means:: Regular car  CHW Additional Questions  If ED/Hospital discharge, follow-up appointment scheduled as recommended?: N/A  Medication changes made following ED/Hospital discharge?: N/A  MyChart active?: No  Patient agreeable to assistance with activating MyChart?: No    Patient accepts CC: Yes. Patient scheduled for assessment with CC  on 2-10-23 at 10am. Patient noted desire to discuss re establish care with new PCP and get a medical waiver form for ciizenship.     Luiza : Aarti ID #[id number]  Re enrolled with [facility] to help with getting new medical waiver and establish care with new PCP.      CHW review assessment, goals and consult with CCC SW 2-10-23    Arlette Doss  Community Health Worker  [facility] Care Coordination  [email or web address]  [email or web address]   Office: [phone]  Fax: [phone]

## 2023-02-01 NOTE — PROGRESS NOTES
2/1/2023  Clinic Care Coordination Contact  Care Team Conversations    Received call from Alban Bergeron at [facility].  Stated he tried to reach patient and then he hung up the phone when he was getting an .  He has difficulty connecting to patient.  Patient had an interview on 1-17-23 he no show/missed the interview   He has 2 cases with immigration office.    Luiza : Tunde ID# [id number]  Conference call with [facility] Alban Clemente and connected with patient to schedule meeting with [facility] regarding the citizenship.  Patient connected with [facility] and set up meeting on to help with citizenship process.   Patient requested to see the doctor to get a new medical waiver  Per [facility] that the old medical waiver was insufficient per immigration office so he needs to see a doctor to get a new medical waiver.    Lost .  Reconnect with another Luiza :  Aarti ID #[id number]    Patient stated he can't meet with the  [facility] because he is working and that he already has an agency that is helping him with citizenship and that he just needs help to get a medical waiver form from the doctor.  Patient stated he hired an  to go with  Stated he has an appt tomorrow in [facility] for citizenship .  He does not have a medical waiver form yet that is why he needs help from CHW.  Stated the hired  told him he has appt tomorrow at the immigration office.    [facility] stated he will withdraw from his case since he has hired  to represent him.  Stated that he has 2 files and 1st file has representative with [facility] but the 2nd file does not have a  representing him and has no medical waiver  Patient will work with new the  and the  he hired.    Scheduled initial assessment with BLAYNE KELLEY 2-10-23 at 10am support to re establish care with new PCP and regarding getting a new medical waiver form from the doctor.    Arlette Doss  Community Health Worker  SUSAN Gee  [facility] Care Coordination  [email or web address]   Office: [phone]  Fax: [phone]

## 2023-02-07 NOTE — LETTER
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 Please excuse him from work during this time.    If you have any questions or concerns, please don't hesitate to call.    Sincerely,         Electronically signed by Charles Washington

## 2023-02-10 ENCOUNTER — PATIENT OUTREACH (OUTPATIENT)
Dept: NURSING | Facility: CLINIC | Age: 33
End: 2023-02-10

## 2023-02-10 ASSESSMENT — ACTIVITIES OF DAILY LIVING (ADL): DEPENDENT_IADLS:: INDEPENDENT

## 2023-02-10 NOTE — PROGRESS NOTES
Clinic Care Coordination Contact    Clinic Care Coordination Contact  OUTREACH    Referral Information:  Referral Source: Self-patient/Caregiver       CC SW called and connected with pt this morning. SW introduced herself and explained that she was calling to assist pt with establishing care with a new PCP at [facility]. Pt confirmed. SW checked if pt was needing assistance with citizenship medical waiver. Pt denied needing help and was only wanting to establish care with PCP. SW and pt started the CCC assessment process but pt part way through needed to end the call due to his child crying and not being able to stop. Pt asked if SW could call back later and SW confirmed that would be fine.    Chief Complaint   Patient presents with     Clinic Care Coordination - Initial        Universal Utilization: Appropriate  Clinic Utilization  Difficulty keeping appointments:: No  Compliance Concerns: No  No-Show Concerns: No  No PCP office visit in Past Year: No  Utilization    Hospital Admissions  0             ED Visits  0             No Show Count (past year)  1                Current as of: 2/3/2023 11:54 AM              Clinical Concerns:  Current Medical Concerns:  Neurocognitive disorder  Current Behavioral Concerns: LOPEZ  Education Provided to patient: N/A   Pain  Pain (GOAL):: No  Health Maintenance Reviewed: Due/Overdue   Overdue          Never   Done ADVANCE CARE PLANNING (Every 5 Years)     Never   Done DEPRESSION ACTION PLAN (Once)     Never   Done COVID-19 Vaccine (1)     Never   Done HIV SCREENING (Once)     Never   Done HEPATITIS C SCREENING (Once)     SEP 9   2010 YEARLY PREVENTIVE VISIT (Yearly)   Last completed: Sep 9, 2009     JUL 17   2020 PHQ-9 (Every 6 Months)  Last completed: Jan 17, 2020     DEC 30   2020 DTAP/TDAP/TD IMMUNIZATION (5 - Td or Tdap)  Last completed: Dec 30, 2010     SEP 1   2022 INFLUENZA VACCINE (1)  Last completed: Dec 10, 2019       Clinical Pathway: None    Medication  Management:  Medication review status: Medications reviewed and no changes reported per patient.             Functional Status:  Dependent ADLs:: Independent  Dependent IADLs:: Independent  Bed or wheelchair confined:: No  Mobility Status: Independent  Fallen 2 or more times in the past year?: No  Any fall with injury in the past year?: No    Living Situation:  Current living arrangement:: I live in a private home with family, I live in a private home with spouse  Type of residence:: Apartment    Lifestyle & Psychosocial Needs:    Social Determinants of Health     Tobacco Use: Not on file   Alcohol Use: Not on file   Financial Resource Strain: Not on file   Food Insecurity: Not on file   Transportation Needs: Not on file   Physical Activity: Not on file   Stress: Not on file   Social Connections: Not on file   Intimate Partner Violence: Not on file   Depression: Not on file   Housing Stability: Not on file     Diet:: Regular  Inadequate nutrition (GOAL):: No  Tube Feeding: No  Inadequate activity/exercise (GOAL):: No  Significant changes in sleep pattern (GOAL): No  Transportation means:: Regular car     Samaritan or spiritual beliefs that impact treatment:: No  Mental health DX:: No  Mental health management concern (GOAL):: No  Chemical Dependency Status: No Current Concerns  Informal Support system:: Spouse, Family             Resources and Interventions:  Current Resources:      Community Resources: None  Supplies Currently Used at Home: None  Equipment Currently Used at Home: none  Employment Status: employed full-time         Advance Care Plan/Directive  Advanced Care Plans/Directives on file:: No  Advanced Care Plan/Directive Status: Considering Options    Referrals Placed: None        Care Plan:  Care Plan: Establish care with new PCP     Problem: HP GENERAL PROBLEM     Goal: I want to schedule an appt to establish care with a new PCP at the [facility]     Start Date: 2/10/2023 Expected End Date:  3/10/2023    This Visit's Progress: 10%    Note:     Barriers: Language barrier  Strengths: Knows how to connect with community for assistance   Patient expressed understanding of goal: Yes  Action steps to achieve this goal:  1. I will get connected with the  through CC WARREN to set up an appointment with a new PCP  2. I will attend that appointment on (TBD)   3. I will connect with CCC monthly (if services are needing to be ongoing)                         Patient/Caregiver understanding: Yes    Outreach Frequency: monthly      Plan: CC WARREN to call and connect with pt at a later time to finish assessment and connecting with a new PCP

## 2023-02-17 ENCOUNTER — PATIENT OUTREACH (OUTPATIENT)
Dept: NURSING | Facility: CLINIC | Age: 33
End: 2023-02-17

## 2023-02-17 SDOH — ECONOMIC STABILITY: INCOME INSECURITY: IN THE LAST 12 MONTHS, WAS THERE A TIME WHEN YOU WERE NOT ABLE TO PAY THE MORTGAGE OR RENT ON TIME?: NO

## 2023-02-17 SDOH — ECONOMIC STABILITY: TRANSPORTATION INSECURITY
IN THE PAST 12 MONTHS, HAS THE LACK OF TRANSPORTATION KEPT YOU FROM MEDICAL APPOINTMENTS OR FROM GETTING MEDICATIONS?: NO

## 2023-02-17 SDOH — HEALTH STABILITY: PHYSICAL HEALTH
ON AVERAGE, HOW MANY DAYS PER WEEK DO YOU ENGAGE IN MODERATE TO STRENUOUS EXERCISE (LIKE A BRISK WALK)?: PATIENT DECLINED

## 2023-02-17 SDOH — ECONOMIC STABILITY: TRANSPORTATION INSECURITY
IN THE PAST 12 MONTHS, HAS LACK OF TRANSPORTATION KEPT YOU FROM MEETINGS, WORK, OR FROM GETTING THINGS NEEDED FOR DAILY LIVING?: NO

## 2023-02-17 SDOH — ECONOMIC STABILITY: FOOD INSECURITY: WITHIN THE PAST 12 MONTHS, THE FOOD YOU BOUGHT JUST DIDN'T LAST AND YOU DIDN'T HAVE MONEY TO GET MORE.: OFTEN TRUE

## 2023-02-17 SDOH — HEALTH STABILITY: PHYSICAL HEALTH: ON AVERAGE, HOW MANY MINUTES DO YOU ENGAGE IN EXERCISE AT THIS LEVEL?: PATIENT DECLINED

## 2023-02-17 SDOH — ECONOMIC STABILITY: FOOD INSECURITY: WITHIN THE PAST 12 MONTHS, YOU WORRIED THAT YOUR FOOD WOULD RUN OUT BEFORE YOU GOT MONEY TO BUY MORE.: OFTEN TRUE

## 2023-02-17 ASSESSMENT — ACTIVITIES OF DAILY LIVING (ADL): DEPENDENT_IADLS:: INDEPENDENT

## 2023-02-17 ASSESSMENT — LIFESTYLE VARIABLES
AUDIT-C TOTAL SCORE: 0
HOW OFTEN DO YOU HAVE SIX OR MORE DRINKS ON ONE OCCASION: NEVER
HOW OFTEN DO YOU HAVE A DRINK CONTAINING ALCOHOL: NEVER
HOW MANY STANDARD DRINKS CONTAINING ALCOHOL DO YOU HAVE ON A TYPICAL DAY: PATIENT DOES NOT DRINK
SKIP TO QUESTIONS 9-10: 1

## 2023-02-17 ASSESSMENT — SOCIAL DETERMINANTS OF HEALTH (SDOH)
HOW OFTEN DO YOU ATTEND CHURCH OR RELIGIOUS SERVICES?: 1 TO 4 TIMES PER YEAR
HOW HARD IS IT FOR YOU TO PAY FOR THE VERY BASICS LIKE FOOD, HOUSING, MEDICAL CARE, AND HEATING?: HARD
HOW OFTEN DO YOU GET TOGETHER WITH FRIENDS OR RELATIVES?: ONCE A WEEK
IN A TYPICAL WEEK, HOW MANY TIMES DO YOU TALK ON THE PHONE WITH FAMILY, FRIENDS, OR NEIGHBORS?: TWICE A WEEK
DO YOU BELONG TO ANY CLUBS OR ORGANIZATIONS SUCH AS CHURCH GROUPS UNIONS, FRATERNAL OR ATHLETIC GROUPS, OR SCHOOL GROUPS?: NO
HOW OFTEN DO YOU ATTENT MEETINGS OF THE CLUB OR ORGANIZATION YOU BELONG TO?: NEVER

## 2023-02-17 NOTE — COMMUNITY RESOURCES LIST (ENGLISH)
02/17/2023   [facility]  N/A  For questions about this resource list or additional care needs, please contact your primary care clinic or care manager.  Phone: [phone]   Email: N/A   Address: [address] [place] [address]   Hours: N/A        Food and Nutrition       Food pantry  1  [facility] Food Shelf Distance: 0.67 miles      In-Person   [address] [place] [address]  Language: English  Hours: Mon - Fri 10:00 AM - 12:30 PM , Mon - Tue 1:30 PM - 3:30 PM , Thu - Fri 1:30 PM - 3:30 PM  Fees: Free   Phone: (607) 696-5713 Email: info@[employer] Website: http://[employer]/food-shelves/     2  [facility] Food Shelf Distance: 1.62 miles      [address] [address]  Language: English  Hours: Wed 4:30 PM - 8:30 PM , Sun 9:30 AM - 12:30 PM  Fees: Free   Phone: (380) 439-2965 Email: info@[employer] Website: http://www.[employer]/     SNAP application assistance  3  [employer] [place] Distance: 2.35 miles      Phone/Virtual   [address] [facility] [address] [place] [address]  Language: English, Hmong, Marshallese, East Timorese, Luxembourger  Hours: Mon - Fri 8:30 AM - 4:30 PM  Fees: Free   Phone: (857) 626-6448 Email: helpline@[employer].org Website: https://www.[employer]ers[employer].org/programs/[place]-food-helpline/     4  [facility] Distance: 2.49 miles      Phone/Virtual   [address] [place] [address]  Language: English  Hours: Mon - Thu 9:00 AM - 4:00 PM , Fri 9:00 AM - 2:00 PM , Sun 10:00 AM - 12:00 PM  Fees: Free   Phone: (475) 689-5352 Email: jairo@[facility].[employer].org Website: http://salvationarmynorth.org/community/[facility]-ave/     Soup kitchen or free meals  5  [facility] - Free Summer Meals Distance: 1.93 miles      In-Person   [address] [place]  [address]  Language: English  Hours: Mon - Fri 4:00 PM - 4:30 PM  Fees: Free   Phone: (950) 340-9025 Email: Kristian@.hospitals. Website: https://www.[facility]     6  [facility] Distance: 2.49 miles      [place] [address] [place] [address]  Language: English  Hours: Mon - Fri 11:45 AM - 12:45 PM  Fees: Free   Phone: (257) 678-5599 Email: jairo@[facility].org Website: http://[facility].org/[facility]/          Important Numbers & Websites       Emergency Services   911  [facility]   311  Poison Control   (480) 216-6273  Suicide Prevention Lifeline   (649) 598-3974 (TALK)  Child Abuse Hotline   (986) 600-6055 (4-A-Child)  Sexual Assault Hotline   (383) 833-9814 (HOPE)  National Runaway Safeline   (492) 335-3273 (RUNAWAY)  All-Options Talkline   (375) 700-8074  Substance Abuse Referral   (123) 512-7131 (HELP)

## 2023-02-17 NOTE — PROGRESS NOTES
Clinic Care Coordination Contact  [facility]/Voicemail       Clinical Data: Care Coordinator Outreach  Outreach attempted x 1.  Unable to leave a message on patient's voicemail with call back information and to request a return call as pt's voicemail box was not set up.  Plan: Care Coordinator will try to reach patient again in 3-5 business days.    KACIE Zeng   Social Work Care Coordinator   [facility]    [phone]

## 2023-02-17 NOTE — COMMUNITY RESOURCES LIST (ENGLISH)
02/17/2023   [facility]  N/A  For questions about this resource list or additional care needs, please contact your primary care clinic or care manager.  Phone: [phone]   Email: N/A   Address: [address] [place] [address]   Hours: N/A        Food and Nutrition       Food pantry  1  [facility] Food Shelf Distance: 0.67 miles      In-Person   [address] [place] [address]  Language: English  Hours: Mon - Fri 10:00 AM - 12:30 PM , Mon - Tue 1:30 PM - 3:30 PM , Thu - Fri 1:30 PM - 3:30 PM  Fees: Free   Phone: (734) 418-6018 Email: info@[employer] Website: http://[employer]/food-shelves/     2  [facility] Food Shelf Distance: 1.62 miles      [address] [address]  Language: English  Hours: Wed 4:30 PM - 8:30 PM , Sun 9:30 AM - 12:30 PM  Fees: Free   Phone: (836) 969-5920 Email: info@[employer] Website: http://www.[employer]/     SNAP application assistance  3  [employer] [place] Distance: 2.35 miles      Phone/Virtual   [address] [facility] [address] [place] [address]  Language: English, Hmong, Mauritian, Estonian, Indian  Hours: Mon - Fri 8:30 AM - 4:30 PM  Fees: Free   Phone: (238) 684-1344 Email: helpline@hungersolutions.org Website: https://www.hungersolutions.org/programs/[place]-food-helpline/     4  [facility] Distance: 2.49 miles      Phone/Virtual   [address] [place] [place] [address]  Language: English  Hours: Mon - Thu 9:00 AM - 4:00 PM , Fri 9:00 AM - 2:00 PM , Sun 10:00 AM - 12:00 PM  Fees: Free   Phone: (476) 148-3022 Email: jairo@[facility].salvationarmy.org Website: http://salvationarmynorth.org/community/[facility]-ave/     Soup kitchen or free meals  5  [facility] [place] [facility] - Free Summer Meals Distance: 1.93 miles      In-Person   [address] [place]  [address]  Language: English  Hours: Mon - Fri 4:00 PM - 4:30 PM  Fees: Free   Phone: (119) 249-2986 Email: Kristian@.[facility]. Website: https://www.[facility]     6  [facility] Distance: 2.49 miles      [address] [address] [place] [address]  Language: English  Hours: Mon - Fri 11:45 AM - 12:45 PM  Fees: Free   Phone: (656) 910-6858 Email: jairo@[facility] [employer].org Website: http://[facility].org/[facility]/          Important Numbers & Websites       Emergency Services   911  [facility] Services   311  Poison Control   (138) 762-1105  Suicide Prevention Lifeline   (180) 932-3920 (TALK)  Child Abuse Hotline   (995) 937-1155 (4-A-Child)  Sexual Assault Hotline   (981) 526-5163 (HOPE)  National Runaway Safeline   (753) 156-4012 (RUNAWAY)  All-Options Talkline   (952) 391-7472  Substance Abuse Referral   (484) 992-4975 (HELP)

## 2023-02-17 NOTE — COMMUNITY RESOURCES LIST (ENGLISH)
02/17/2023   [facility]  N/A  For questions about this resource list or additional care needs, please contact your primary care clinic or care manager.  Phone: [phone]   Email: N/A   Address: [address] [place] [address]   Hours: N/A        Food and Nutrition       Food pantry  1  [facility] Food Shelf Distance: 0.67 miles      In-Person   [address] [place] [address]  Language: English  Hours: Mon - Fri 10:00 AM - 12:30 PM , Mon - Tue 1:30 PM - 3:30 PM , Thu - Fri 1:30 PM - 3:30 PM  Fees: Free   Phone: (473) 979-6910 Email: info@[employer] Website: http://[employer]/food-shelves/     2  [facility] Food Shelf Distance: 1.62 miles      [address] [address]  Language: English  Hours: Wed 4:30 PM - 8:30 PM , Sun 9:30 AM - 12:30 PM  Fees: Free   Phone: (931) 680-7315 Email: info@[employer] Website: http://www.[employer]/     SNAP application assistance  3  [employer] [place] Distance: 2.35 miles      Phone/Virtual   [address] [facility] [address] [place] [address]  Language: English, Hmong, Kosovan, Vietnamese, Tristanian  Hours: Mon - Fri 8:30 AM - 4:30 PM  Fees: Free   Phone: (798) 603-6811 Email: helpline@hungersolutions.org Website: https://www.hungersolutions.org/programs/[place]-food-helpline/     4  [facility] Distance: 2.49 miles      Phone/Virtual   [address] [place] [address]  Language: English  Hours: Mon - Thu 9:00 AM - 4:00 PM , Fri 9:00 AM - 2:00 PM , Sun 10:00 AM - 12:00 PM  Fees: Free   Phone: (732) 474-4033 Email: jairo@[facility].salvationarmy.org Website: http://salvationarmynorth.org/community/[facility]-ave/     Soup kitchen or free meals  5  [facility] [place] [facility] - Free Summer Meals Distance: 1.93 miles      In-Person   [address] [place]  [address]  Language: English  Hours: Mon - Fri 4:00 PM - 4:30 PM  Fees: Free   Phone: (809) 443-5764 Email: Kristian@.[facility] in [facility] [place]. Website: https://www.[facility]     6  [facility] Distance: 2.49 miles      [place] [address] [address] [place] [address]  Language: English  Hours: Mon - Fri 11:45 AM - 12:45 PM  Fees: Free   Phone: (779) 958-7341 Email: jairo@[facility].org Website: http://[facility].org/[facility]/          Important Numbers & Websites       Emergency Services   911  [facility] Services   311  Poison Control   (917) 354-4065  Suicide Prevention Lifeline   (903) 596-5706 (TALK)  Child Abuse Hotline   (126) 768-2379 (4-A-Child)  Sexual Assault Hotline   (908) 267-5476 (HOPE)  National Runaway Safeline   (339) 864-7748 (RUNAWAY)  All-Options Talkline   (302) 705-1548  Substance Abuse Referral   (867) 345-4351 (HELP)

## 2023-02-17 NOTE — COMMUNITY RESOURCES LIST (ENGLISH)
02/17/2023   [facility]  N/A  For questions about this resource list or additional care needs, please contact your primary care clinic or care manager.  Phone: [phone]   Email: N/A   Address: [address] [place] [address]   Hours: N/A        Food and Nutrition       Food pantry  1  [facility] Food Shelf Distance: 0.67 miles      In-Person   [address] [place] [address]  Language: English  Hours: Mon - Fri 10:00 AM - 12:30 PM , Mon - Tue 1:30 PM - 3:30 PM , Thu - Fri 1:30 PM - 3:30 PM  Fees: Free   Phone: (620) 813-2477 Email: info@[employer] Website: http://[employer]/food-shelves/     2  [facility] Food Shelf Distance: 1.62 miles      [address] [address]  Language: English  Hours: Wed 4:30 PM - 8:30 PM , Sun 9:30 AM - 12:30 PM  Fees: Free   Phone: (293) 516-6519 Email: info@[employer] Website: http://www.[employer]/     SNAP application assistance  3  [employer] [place] Distance: 2.35 miles      Phone/Virtual   [address] [facility] [address] [place] [address]  Language: English, Hmong, Cambodian, Danish, South Korean  Hours: Mon - Fri 8:30 AM - 4:30 PM  Fees: Free   Phone: (276) 698-7215 Email: helpline@hungersolutions.org Website: https://www.hungersolutions.org/programs/mn-food-helpline/     4  [facility] Distance: 2.49 miles      Phone/Virtual   [address] [place] [address]  Language: English  Hours: Mon - Thu 9:00 AM - 4:00 PM , Fri 9:00 AM - 2:00 PM , Sun 10:00 AM - 12:00 PM  Fees: Free   Phone: (602) 805-3202 Email: jairo@[facility].salvationarmy.org Website: http://salvationarmynorth.org/community/[facility]-ave/     Soup kitchen or free meals  5  [facility] [place] [facility] - Free Summer Meals Distance: 1.93 miles      In-Person   [address] [place]  [address]  Language: English  Hours: Mon - Fri 4:00 PM - 4:30 PM  Fees: Free   Phone: (540) 455-2767 Email: Kristian@.[facility]. Website: https://www.[facility]     6  [facility] Distance: 2.49 miles      [place] [address] [address] [place] [address]  Language: English  Hours: Mon - Fri 11:45 AM - 12:45 PM  Fees: Free   Phone: (118) 239-1202 Email: jairo@[facility] Hospitalvpod.tvy.org Website: http://[facility].org/[facility] [place]/[facility]/          Important Numbers & Websites       Emergency Services   911  [facility] Services   311  Poison Control   (526) 680-1173  Suicide Prevention Lifeline   (991) 223-7214 (TALK)  Child Abuse Hotline   (404) 308-3192 (4-A-Child)  Sexual Assault Hotline   (536) 131-3429 (HOPE)  National Runaway Safeline   (236) 595-3247 (RUNAWAY)  All-Options Talkline   (601) 324-5490  Substance Abuse Referral   (669) 360-6068 (HELP)

## 2023-02-17 NOTE — COMMUNITY RESOURCES LIST (ENGLISH)
02/17/2023   [facility]  N/A  For questions about this resource list or additional care needs, please contact your primary care clinic or care manager.  Phone: [phone]   Email: N/A   Address: [address] [place] [address]   Hours: N/A        Financial Stability       Health insurance application assistance  1  [facility] Distance: 1.63 miles      Phone/Virtual   [address] [place] [address]  Language: English  Hours: Mon 8:00 AM - 5:00 PM , Wed 8:00 AM - 5:00 PM  Fees: Free   Phone: (520) 518-4001 Email: info@[employer].org Website: http://ListRunner.org     2  [place]  [facility] Distance: 2.42 miles      In-Person, Phone/Virtual   [address] [place] [address]  Language: English, Luiza, Tuvaluan  Hours: Mon - Fri 8:00 AM - 4:00 PM  Fees: Free   Phone: (368) 779-8583 Email: info@[employer].org Website: http://www.vsn.org/     Rent and mortgage payment assistance  3  [facility] Distance: 2.49 miles      Phone/Virtual   [address] [place] [address]  Language: English  Hours: Mon - Fri 9:00 AM - 4:00 PM  Fees: Free   Phone: (634) 491-7522 Email: jairo@[facility].org Website: http://[facility].org/Count includes the [facility]/     4  [employer] (HAP) [facility] Office - Supportive Housing Assistance Program (SHAP) Distance: 2.75 miles      Phone/Virtual   [address] [place] [address]  Language: English, Hmong, Luiza, Italian  Hours: Mon - Fri 8:30 AM - 5:00 PM  Fees: Free   Phone: (167) 822-1740 Email: vania@hmong.org Website: http://www.hmong.org/hap-impact-areas/     Utility payment assistance  5  [facility] Distance: 2.49 miles      Phone/Virtual   [address] [place] [address]  Language: English  Hours: Mon - Fri 9:00 AM - 4:00  PM  Fees: Free   Phone: (856) 390-9231 Email: jairo@[facility] [place] [facility] Institute[facility].org Website: http://Dana-Farber Cancer InstituteEpiGaNorth.org/[facility]/sx-cgms-wgiin-ave/     6  [employer] ([facility]) [facility] - Supportive Housing Assistance Program ([facility]) Distance: 2.75 miles      Phone/Virtual   [address] [place] [address]  Language: English, Hmong, Luiza, Micronesian  Hours: Mon - Fri 8:30 AM - 5:00 PM  Fees: Free   Phone: (154) 522-3899 Email: vania@ong.org Website: http://www.ong.org/hap-impact-areas/          Food and Nutrition       Food pantry  7  [facility] Food Shelf Distance: 0.67 miles      In-Person   [address] [facility] [address] [place] [address]  Language: English  Hours: Mon - Fri 10:00 AM - 12:30 PM , Mon - Tue 1:30 PM - 3:30 PM , Thu - Fri 1:30 PM - 3:30 PM  Fees: Free   Phone: (542) 758-8740 Email: info@[employer] Website: http://[employer]/food-shelves/     8  [facility] Food Shelf Distance: 1.62 miles      [address] [place] [address]  Language: English  Hours: Wed 4:30 PM - 8:30 PM , Sun 9:30 AM - 12:30 PM  Fees: Free   Phone: (678) 103-8729 Email: info@[employer] Website: http://www.[employer]/     SNAP application assistance  9  [employer] [place] Distance: 2.35 miles      Phone/Virtual   [address] [facility] [address] [place] [address]  Language: English, Hmong, [place] citizen, Micronesian, [place] Islander  Hours: Mon - Fri 8:30 AM - 4:30 PM  Fees: Free   Phone: (395) 897-2589 Email: helpline@hungersolutions.org Website: https://www.hungersolutions.org/programs/mn-food-helpline/     10  [facility] Distance: 2.49 miles      Phone/Virtual   [address] [place] [address]  Language: English  Hours: Mon - Thu 9:00 AM - 4:00 PM , Fri 9:00 AM - 2:00 PM , Sun 10:00 AM - 12:00 PM  Fees: Free   Phone: (745) 104-3646 Email: jairo@[facility] [place] [facility].org  Website: http://Cooley Dickinson HospitalLab Automate TechnologiesSaint Joseph Health Center.org/[facility]/vz-wxae-ypttk-ave/     Soup kitchen or free meals  11  [facility] [place] [facility] - Free Summer Meals Distance: 1.93 miles      In-Person   [address] [place] [address]  Language: English  Hours: Mon - Fri 4:00 PM - 4:30 PM  Fees: Free   Phone: (822) 702-4741 Email: Kristian@.[facility]. Website: https://www.[facility]/facilities/uoptehgwb-bepm-ldxbqfmnvi-[place]     12  [facility] Distance: 2.49 miles      Pick   [address] [place] [address]  Language: English  Hours: Mon - Fri 11:45 AM - 12:45 PM  Fees: Free   Phone: (822) 482-5319 Email: jairo@[facility] Williams Medical CenterEntitle.org Website: http://Cooley Dickinson HospitalLab Automate TechnologiesSaint Joseph Health Center.org/[facility]/rk-itgl-gkrdl-ave/          Important Numbers & Websites       Emergency Services   911  [facility]   311  Poison Control   (826) 327-9491  Suicide Prevention Lifeline   (468) 953-8502 (TALK)  Child Abuse Hotline   (563) 475-4693 (4-A-Child)  Sexual Assault Hotline   (780) 415-6986 (HOPE)  National Runaway Safeline   (750) 796-6120 (RUNAWAY)  All-Options Talkline   (245) 931-5010  Substance Abuse Referral   (402) 402-3299 (HELP)

## 2023-02-17 NOTE — COMMUNITY RESOURCES LIST (ENGLISH)
02/17/2023   [facility]  N/A  For questions about this resource list or additional care needs, please contact your primary care clinic or care manager.  Phone: [phone]   Email: N/A   Address: [address] [place] [address]   Hours: N/A        Food and Nutrition       Food pantry  1  [facility] Food Shelf Distance: 0.67 miles      In-Person   [address] [place] [address]  Language: English  Hours: Mon - Fri 10:00 AM - 12:30 PM , Mon - Tue 1:30 PM - 3:30 PM , Thu - Fri 1:30 PM - 3:30 PM  Fees: Free   Phone: (219) 996-3669 Email: info@[employer] Website: http://[employer]/food-shelves/     2  [facility] Food Shelf Distance: 1.62 miles      [address] [address]  Language: English  Hours: Wed 4:30 PM - 8:30 PM , Sun 9:30 AM - 12:30 PM  Fees: Free   Phone: (654) 100-5683 Email: info@[employer] Website: http://www.[place]/     SNAP application assistance  3  [employer] [place] Distance: 2.35 miles      Phone/Virtual   [address] [facility] [address] [place] [address]  Language: English, Hmong, Tristanian, Colombian, Wallisian  Hours: Mon - Fri 8:30 AM - 4:30 PM  Fees: Free   Phone: (199) 490-3453 Email: helpline@hungersolutions.org Website: https://www.hungersolutions.org/programs/[place]-food-helpline/     4  [facility] Distance: 2.49 miles      Phone/Virtual   [address] [place] [address]  Language: English  Hours: Mon - Thu 9:00 AM - 4:00 PM , Fri 9:00 AM - 2:00 PM , Sun 10:00 AM - 12:00 PM  Fees: Free   Phone: (714) 355-3371 Email: jairo@[facility].salvationarmy.org Website: http://salvationarmynorth.org/community/[facility]-ave/     Soup kitchen or free meals  5  [facility] [place] [facility] - Free Summer Meals Distance: 1.93 miles      In-Person   [address] [place]  [address]  Language: English  Hours: Mon - Fri 4:00 PM - 4:30 PM  Fees: Free   Phone: (151) 852-6200 Email: Kristian@.[facility]. Website: https://www.[facility]     6  [facility] Distance: 2.49 miles      [address] [address] [place] [address]  Language: English  Hours: Mon - Fri 11:45 AM - 12:45 PM  Fees: Free   Phone: (700) 512-1614 Email: jairo@[facility] Medical CenterLema21y.org Website: http://[facility].org/[facility]/          Important Numbers & Websites       Emergency Services   911  [facility] Services   311  Poison Control   (945) 382-4176  Suicide Prevention Lifeline   (595) 786-6984 (TALK)  Child Abuse Hotline   (490) 449-6937 (4-A-Child)  Sexual Assault Hotline   (207) 331-5912 (HOPE)  National Runaway Safeline   (762) 861-5025 (RUNAWAY)  All-Options Talkline   (291) 532-4903  Substance Abuse Referral   (845) 613-6962 (HELP)

## 2023-02-17 NOTE — PROGRESS NOTES
Clinic Care Coordination Contact    Clinic Care Coordination Contact  OUTREACH    Referral Information:  Referral Source: Self-patient/Caregiver    CC SW called and connected with pt this afternoon to complete the CCC assessment questions. Pt confirmed that they were available at this time, so SW an pt went through the social determinants of health. Pt identified food access being an area of support and explained that they only receive $85 a month from SNAP. SW offered to send pt information for food moyer and meals that are nearby to his address and pt gave verbal permission for SW to end the resource via email. SW and pt arranged to connect 2/24 @ 11 to check on resource reach out as well as schedule pt with a [facility] PCP at [facility].       Chief Complaint   Patient presents with     Clinic Care Coordination - Initial        Universal Utilization: Appropriate  Clinic Utilization  Difficulty keeping appointments:: No  Compliance Concerns: No  No-Show Concerns: No  No PCP office visit in Past Year: No  Utilization    Hospital Admissions  0             ED Visits  0             No Show Count (past year)  1                Current as of: 2/10/2023  1:47 PM              Clinical Concerns:  Current Medical Concerns:  Neurocognitive Disorder  Current Behavioral Concerns: LOPEZ  Education Provided to patient: Supplemental food resources (food moyer, meals, etc)   Pain  Pain (GOAL):: No  Health Maintenance Reviewed: Due/Overdue  Overdue          Never   Done ADVANCE CARE PLANNING (Every 5 Years)     Never   Done DEPRESSION ACTION PLAN (Once)     Never   Done COVID-19 Vaccine (1)     Never   Done HIV SCREENING (Once)     Never   Done HEPATITIS C SCREENING (Once)     SEP 9   2010 YEARLY PREVENTIVE VISIT (Yearly)   Last completed: Sep 9, 2009     JUL 17   2020 PHQ-9 (Every 6 Months)  Last completed: Jan 17, 2020     DEC 30   2020 DTAP/TDAP/TD IMMUNIZATION (5 - Td or Tdap)  Last completed: Dec 30, 2010     SEP 1   2022 INFLUENZA VACCINE  (1)  Last completed: Dec 10, 2019       Clinical Pathway: None    Medication Management:  Medication review status: Medications reviewed and no changes reported per patient.             Functional Status:  Dependent ADLs:: Independent  Dependent IADLs:: Independent  Bed or wheelchair confined:: No  Mobility Status: Independent  Fallen 2 or more times in the past year?: No  Any fall with injury in the past year?: No    Living Situation:  Current living arrangement:: I live in a private home with family, I live in a private home with spouse  Type of residence:: Apartment    Lifestyle & Psychosocial Needs:    Social Determinants of Health     Tobacco Use: Not on file   Alcohol Use: Not At Risk     Frequency of Alcohol Consumption: Never     Average Number of Drinks: Patient does not drink     Frequency of Binge Drinking: Never   Financial Resource Strain: High Risk     Difficulty of Paying Living Expenses: Hard   Food Insecurity: Food Insecurity Present     Worried About Running Out of Food in the Last Year: Often true     Ran Out of Food in the Last Year: Often true   Transportation Needs: No Transportation Needs     Lack of Transportation (Medical): No     Lack of Transportation (Non-Medical): No   Physical Activity: Unknown     Days of Exercise per Week: Patient refused     Minutes of Exercise per Session: Patient refused   Stress: No Stress Concern Present     Feeling of Stress : Only a little   Social Connections: Moderately Integrated     Frequency of Communication with Friends and Family: Twice a week     Frequency of Social Gatherings with Friends and Family: Once a week     Attends Tenriism Services: 1 to 4 times per year     Active Member of Clubs or Organizations: No     Attends Club or Organization Meetings: Never     Marital Status:    Intimate Partner Violence: Not on file   Depression: Not on file   Housing Stability: Low Risk      Unable to Pay for Housing in the Last Year: No     Number of  Places Lived in the Last Year: 2     Unstable Housing in the Last Year: No     Diet:: Regular  Inadequate nutrition (GOAL):: No  Tube Feeding: No  Inadequate activity/exercise (GOAL):: No  Significant changes in sleep pattern (GOAL): No  Transportation means:: Regular car     Uatsdin or spiritual beliefs that impact treatment:: No  Mental health DX:: No  Mental health management concern (GOAL):: No  Informal Support system:: Spouse, Family             Resources and Interventions:  Current Resources:      Community Resources: None  Supplies Currently Used at Home: None  Equipment Currently Used at Home: none  Employment Status: employed full-time         Advance Care Plan/Directive  Advanced Care Plans/Directives on file:: No  Advanced Care Plan/Directive Status: Considering Options    Referrals Placed: None       Care Plan:  Care Plan: Establish care with new PCP     Problem: HP GENERAL PROBLEM     Goal: I want to schedule an appt to establish care with a [facility] PCP at the [facility]     Start Date: 2/10/2023 Expected End Date: 3/10/2023    This Visit's Progress: 10%    Note:     Barriers: Language barrier  Strengths: Knows how to connect with community for assistance   Patient expressed understanding of goal: Yes  Action steps to achieve this goal:  1. I will get connected with the  through BLAYNE KELLEY to set up an appointment with a new PCP  2. I will attend that appointment on (TBD)   3. I will connect with CCC monthly (if services are needing to be ongoing)                         Patient/Caregiver understanding: Yes    Outreach Frequency: monthly      Plan: BLAYNE KELLEY to connect with pt 2/24 to assist with scheduling appt to establish new pcp at [facility]    KACIE Zeng   Social Work Care Coordinator   [facility]    [phone]

## 2023-02-17 NOTE — COMMUNITY RESOURCES LIST (ENGLISH)
02/17/2023   [facility]  N/A  For questions about this resource list or additional care needs, please contact your primary care clinic or care manager.  Phone: [phone]   Email: N/A   Address: [address] [place] [address]   Hours: N/A        Food and Nutrition       Food pantry  1  [facility] Food Shelf Distance: 0.67 miles      In-Person   [address] [place] [address]  Language: English  Hours: Mon - Fri 10:00 AM - 12:30 PM , Mon - Tue 1:30 PM - 3:30 PM , Thu - Fri 1:30 PM - 3:30 PM  Fees: Free   Phone: (674) 880-6439 Email: info@[employer] Website: http://[employer]/food-shelves/     2  [facility] Food Shelf Distance: 1.62 miles      [address] [address]  Language: English  Hours: Wed 4:30 PM - 8:30 PM , Sun 9:30 AM - 12:30 PM  Fees: Free   Phone: (530) 164-3282 Email: info@[employer] Website: http://www.[employer]/     SNAP application assistance  3  [employer] [place] Distance: 2.35 miles      Phone/Virtual   [address] [facility] [address] [place] [address]  Language: English, Hmong, Barbadian, Australian, Polish  Hours: Mon - Fri 8:30 AM - 4:30 PM  Fees: Free   Phone: (507) 566-5231 Email: helpline@[employer].org Website: https://www.hungersolutions.org/programs/[place]-food-helpline/     4  [facility] Distance: 2.49 miles      Phone/Virtual   [address] [place] [address]  Language: English  Hours: Mon - Thu 9:00 AM - 4:00 PM , Fri 9:00 AM - 2:00 PM , Sun 10:00 AM - 12:00 PM  Fees: Free   Phone: (309) 914-8718 Email: jairo@[facility].[employer]vationarmy.org Website: http://salvationarmynorth.org/community/[facility]-ave/     Soup kitchen or free meals  5  [facility] - Free Summer Meals Distance: 1.93 miles      In-Person   [address] [place]  [address]  Language: English  Hours: Mon - Fri 4:00 PM - 4:30 PM  Fees: Free   Phone: (671) 416-6837 Email: Kristian@.[facility]. Website: https://www.[facility]     6  [place] [facility] Distance: 2.49 miles      [place] [address] [place] [address]  Language: English  Hours: Mon - Fri 11:45 AM - 12:45 PM  Fees: Free   Phone: (492) 566-9020 Email: jairo@[facility] Center[facility]ampy.org Website: http://[facility].org/[facility]/          Important Numbers & Websites       Emergency Services   911  [facility] Services   311  Poison Control   (842) 851-1277  Suicide Prevention Lifeline   (762) 250-2110 (TALK)  Child Abuse Hotline   (614) 667-3785 (4-A-Child)  Sexual Assault Hotline   (914) 619-8460 (HOPE)  National Runaway Safeline   (939) 371-2382 (RUNAWAY)  All-Options Talkline   (713) 956-7896  Substance Abuse Referral   (233) 293-3890 (HELP)

## 2023-02-17 NOTE — COMMUNITY RESOURCES LIST (ENGLISH)
02/17/2023   [facility] Clin[facility]  N/A  For questions about this resource list or additional care needs, please contact your primary care clinic or care manager.  Phone: [phone]   Email: N/A   Address: [address] [place] [address]   Hours: N/A        Food and Nutrition       Food pantry  1  [facility] Food Shelf Distance: 0.67 miles      In-Person   [address] [place] [address]  Language: English  Hours: Mon - Fri 10:00 AM - 12:30 PM , Mon - Tue 1:30 PM - 3:30 PM , Thu - Fri 1:30 PM - 3:30 PM  Fees: Free   Phone: (179) 962-7065 Email: info@[employer] Website: http://[employer]/food-shelves/     2  [facility] Food Shelf Distance: 1.62 miles      [address] [address]  Language: English  Hours: Wed 4:30 PM - 8:30 PM , Sun 9:30 AM - 12:30 PM  Fees: Free   Phone: (607) 218-9875 Email: info@[employer] Website: http://www.[employer]/     SNAP application assistance  3  [employer] [place] Distance: 2.35 miles      Phone/Virtual   [address] [facility] [address] [place] [address]  Language: English, Hmong, Fijian, South Korean, Montenegrin  Hours: Mon - Fri 8:30 AM - 4:30 PM  Fees: Free   Phone: (639) 904-4927 Email: helpline@[employer].org Website: https://www.hungersolutions.org/programs/[place]-food-helpline/     4  [facility] Distance: 2.49 miles      Phone/Virtual   [address] [place] [address]  Language: English  Hours: Mon - Thu 9:00 AM - 4:00 PM , Fri 9:00 AM - 2:00 PM , Sun 10:00 AM - 12:00 PM  Fees: Free   Phone: (925) 409-1594 Email: jairo@[facility].salvationarmy.org Website: http://salvationarmynorth.org/community/[facility]-ave/     Soup kitchen or free meals  5  [facility] [place] [facility] - Free Summer Meals Distance: 1.93 miles      In-Person   [address] [place]  [address]  Language: English  Hours: Mon - Fri 4:00 PM - 4:30 PM  Fees: Free   Phone: (226) 984-7885 Email: Kristian@.[facility]. Website: https://www.[facility]     6  [facility] Distance: 2.49 miles      [place] [address] [place] [address]  Language: English  Hours: Mon - Fri 11:45 AM - 12:45 PM  Fees: Free   Phone: (909) 195-2550 Email: jairo@[facility].org Website: http://[facility].org/[facility]/          Important Numbers & Websites       Emergency Services   911  [facility] Services   311  Poison Control   (506) 522-6524  Suicide Prevention Lifeline   (838) 310-9173 (TALK)  Child Abuse Hotline   (448) 774-5266 (4-A-Child)  Sexual Assault Hotline   (165) 979-6621 (HOPE)  National Runaway Safeline   (206) 459-6158 (RUNAWAY)  All-Options Talkline   (417) 204-9211  Substance Abuse Referral   (684) 514-9213 (HELP)

## 2023-03-03 ENCOUNTER — PATIENT OUTREACH (OUTPATIENT)
Dept: CARE COORDINATION | Facility: CLINIC | Age: 33
End: 2023-03-03
Payer: COMMERCIAL

## 2023-03-03 NOTE — PROGRESS NOTES
3/3/2023  Clinic Care Coordination Contact  Community Health Worker Follow Up    Intervention and Education during outreach:   Luiza : Eleanor   ID: [id number]  Called and spoke to patient and follow up on goal.  Patient reported:  -he passed his citizenship and will be getting his certificate next week 3-7-23.  Congratulated patient.  Patient only available on Fridays  Conference call with patient and support to connect with  to schedule established care with new PCP and AWV appt.  Appt scheduled for 4-7-23 at 2:40pm  Pt confirmed AWV appt    CC SW review action steps    CHW Follow up: Monthly  CHW Plan: Follow up on goal (s)  CHW Next Follow Up: 4-14-23    Arlette Doss  Community Health Worker  [facility] Care Coordination  [email or web address]   Office: [phone]  Fax: [phone]  Clinic Care Coordination Contact    Community Health Worker Follow Up    Care Gaps:     Health Maintenance Due   Topic Date Due     ADVANCE CARE PLANNING  Never done     DEPRESSION ACTION PLAN  Never done     COVID-19 Vaccine (1) Never done     HIV SCREENING  Never done     HEPATITIS C SCREENING  Never done     YEARLY PREVENTIVE VISIT  09/09/2010     PHQ-9  07/17/2020     DTAP/TDAP/TD IMMUNIZATION (5 - Td or Tdap) 12/30/2020     INFLUENZA VACCINE (1) 09/01/2022       Care Gap Goal set: Yes scheduled on  4-7-23 AWV    Care Plan:   Care Plan: Establish care with new PCP     Problem: HP GENERAL PROBLEM     Goal: I want to schedule an appt to establish care with a new PCP and complete Yearly Preventative [facility] 4[facility]7-23 at [facility]     Start Date: 2/10/2023 Expected End Date: 4/28/2023    This Visit's Progress: 60% Recent Progress: 10%    Note:     Barriers: Language barrier  Strengths: Knows how to connect with community for assistance   Patient expressed understanding of goal: Yes  Action steps to achieve this goal:  1. I will attend appt on 4-7-23 at 2:40pm  with Dr Bolaños for AWV and establish care with new PCP.  Updated 3-3-23 AL

## 2023-04-03 PROBLEM — H53.003 AMBLYOPIA OF BOTH EYES: Status: ACTIVE | Noted: 2019-12-19

## 2023-04-10 ENCOUNTER — PATIENT OUTREACH (OUTPATIENT)
Dept: CARE COORDINATION | Facility: CLINIC | Age: 33
End: 2023-04-10
Payer: COMMERCIAL

## 2023-04-10 NOTE — PROGRESS NOTES
Care Coordination Clinician Chart Review    Situation: Patient chart reviewed by Care Coordinator.       Background: Care Coordination Program started: 2/1/2023. Initial assessment completed and patient-centered care plan(s) were developed with participation from patient. Lead CC handed patient off to CHW for continued outreaches.       Assessment: Per chart review, patient outreach completed by CC CHW on 3/3/23.  Patient is actively working to accomplish goal(s). Patient's goal(s) appropriate and relevant at this time. Patient is not due for updated Plan of Care.  Assessments will be completed annually or as needed/with change of patient status.      Care Plan: Establish care with new PCP     Problem: HP GENERAL PROBLEM     Goal: I want to schedule an appt to establish care with a new PCP and complete Yearly Preventative Visit on 4-7-23 at the [facility]     Start Date: 2/10/2023 Expected End Date: 4/28/2023    This Visit's Progress: 60% Recent Progress: 10%    Note:     Barriers: Language barrier  Strengths: Knows how to connect with community for assistance   Patient expressed understanding of goal: Yes  Action steps to achieve this goal:  1. I will attend appt on 4-7-23 at 2:40pm with Dr Bolaños for AWV and establish care with new PCP.  Updated 3-3-23 AL                             Plan/Recommendations: The patient will continue working with Care Coordination to achieve goal(s) as above. CHW will continue outreaches at minimum every 30 days and will involve Lead CC as needed or if patient is ready to move to Maintenance. Lead CC will continue to monitor CHW outreaches and patient's progress to goal(s) every 6 weeks.     Plan of Care updated and sent to patient: KACIE Johnson   Social Work Care Coordinator   [facility]    [phone]

## 2023-04-10 NOTE — PROGRESS NOTES
4/10/2023  Clinic Care Coordination Contact  Community Health Worker Follow Up    Intervention and Education during outreach:   Maintenance 2 months follow up     Luiza  : Ma ID#[id number]    Called and spoke to patient and follow up on goal.  Patient reported:  -he missed appt 4-7-23. Stated he is at work and can't reschedule appt today.  Stated he will call clinic to reschedule appt to see new PCP at [facility]   Best day to call is Friday.    CHW Follow up: Monthly  CHW Plan: Follow up on goal  CHW Next Follow Up: 5-19-23    Arlette Doss  Community Health Worker  [facility] Care Coordination  [email or web address]   Office: [phone]  Fax: [phone]  Clinic Care Coordination Contact    Community Health Worker Follow Up    Care Gaps:     Health Maintenance Due   Topic Date Due     ADVANCE CARE PLANNING  Never done     DEPRESSION ACTION PLAN  Never done     COVID-19 Vaccine (1) Never done     HIV SCREENING  Never done     HEPATITIS C SCREENING  Never done     YEARLY PREVENTIVE VISIT  09/09/2010     PHQ-9  07/17/2020     DTAP/TDAP/TD IMMUNIZATION (5 - Td or Tdap) 12/30/2020     INFLUENZA VACCINE (1) 09/01/2022       Care Gap Goal set: Yes and Patient accepted scheduling phone number for [phone]  to schedule independently     Care Plan:   Care Plan: Establish care with new PCP     Problem: HP GENERAL PROBLEM     Goal: I want to schedule an appt to establish care with a new PCP and complete Yearly Preventative Visit [facility]7-23 at the [facility]     Start Date: 2/10/2023 Expected End Date: 5/31/2023    This Visit's Progress: 50% Recent Progress: 60%    Note:     Barriers: Language barrier  Strengths: Knows how to connect with community for assistance   Patient expressed understanding of goal: Yes  Action steps to achieve this goal:  1. I will call the clinic to reschedule appt for AWV and establish care with new PCP Dr Bolaños at [facility]  Clinic  Updated 4-10-23 AL

## 2023-05-19 ENCOUNTER — PATIENT OUTREACH (OUTPATIENT)
Dept: CARE COORDINATION | Facility: CLINIC | Age: 33
End: 2023-05-19
Payer: COMMERCIAL

## 2023-05-19 NOTE — PROGRESS NOTES
5/19/2023  Clinic Care Coordination Contact  [facility]/Voicemail  [facility] : Issac      Clinical Data: Care Coordinator Outreach: Follow up on goal(s)  Outreach attempted x 1. VM not set up yet unable to  Leave message on patient's voicemail with call back information and requested return call.  Plan: Care Coordinator will try to reach patient again in 10 business days.    CHW follow up:  5-24-23    Arlette Doss  Community Health Worker  [facility]  Clinic Care Coordination  [email or web address]   Office: [phone]  Fax: [phone]

## 2023-05-23 ENCOUNTER — PATIENT OUTREACH (OUTPATIENT)
Dept: CARE COORDINATION | Facility: CLINIC | Age: 33
End: 2023-05-23
Payer: COMMERCIAL

## 2023-05-23 NOTE — PROGRESS NOTES
Care Coordination Clinician Chart Review    Situation: Patient chart reviewed by Care Coordinator.       Background: Care Coordination Program started: 2/1/2023. Initial assessment completed and patient-centered care plan(s) were developed with participation from patient. Lead CC handed patient off to CHW for continued outreaches.       Assessment: Per chart review, patient outreach completed by CC CHW on 5/19/23.  Patient is actively working to accomplish goal(s). Patient's goal(s) appropriate and relevant at this time. Patient is not due for updated Plan of Care.  Assessments will be completed annually or as needed/with change of patient status.      Care Plan: Establish care with new PCP     Problem: HP GENERAL PROBLEM     Goal: I want to schedule an appt to establish care with a new PCP and complete Yearly Preventative [facility] on [facility]23 at the [facility]     Start Date: 2/10/2023 Expected End Date: 5/31/2023    This Visit's Progress: 50% Recent Progress: 60%    Note:     Barriers: Language barrier  Strengths: Knows how to connect with community for assistance   Patient expressed understanding of goal: Yes  Action steps to achieve this goal:  1. I will call the clinic to reschedule appt for AWV and establish care with new PCP Dr Bolaños at [facility]  Updated 4-10-23 AL                           Plan/Recommendations: The patient will continue working with Care Coordination to achieve goal(s) as above. CHW will continue outreaches at minimum every 30 days and will involve Lead CC as needed or if patient is ready to move to Maintenance. Lead CC will continue to monitor CHW outreaches and patient's progress to goal(s) every 6 weeks.     Plan of Care updated and sent to patient: KACIE Johnson   Social Work Care Coordinator   [facility]    [phone]

## 2023-05-24 ENCOUNTER — PATIENT OUTREACH (OUTPATIENT)
Dept: CARE COORDINATION | Facility: CLINIC | Age: 33
End: 2023-05-24
Payer: COMMERCIAL

## 2023-05-24 NOTE — PROGRESS NOTES
5/24/2023  Clinic Care Coordination Contact    Community Health Worker Follow Up    Care Gaps:     Health Maintenance Due   Topic Date Due     ADVANCE CARE PLANNING  Never done     DEPRESSION ACTION PLAN  Never done     COVID-19 Vaccine (1) Never done     HIV SCREENING  Never done     HEPATITIS C SCREENING  Never done     YEARLY PREVENTIVE VISIT  09/09/2010     PHQ-9  07/17/2020     DTAP/TDAP/TD IMMUNIZATION (5 - Td or Tdap) 12/30/2020     INFLUENZA VACCINE (1) 09/01/2022       Patient accepted scheduling phone number for [phone] will call on his own to reschedule AWV.  to schedule independently     Care Plan:     Clinic Care Coordination Contact  Community Health Worker Follow Up    Intervention and Education during outreach:   Luiza :Kim ID #[id number]   Called and spoke to patient and follow up to reschedule AWV and establish care with new PCP   Patient reported:  - he is not able to reschedule today and that he will call the clinic on his own to reschedule AWV with new PCP end of July.  -he will be out of town in July.    Discussed with pt if has any other goals or needs from [facility] team.  Patient stated he does not at this time.  Transition to maintenance follow up in 2 months.  Patient okay to follow up in 2 months.    Patient has completed all goals with Clinic Care Coordination.    Routed to CC SW to review the chart and confirm if maintenance is approved    CHW Follow up: 2 months  CHW Plan: Discuss graduating from CCC if no other goals or needs from CC Team  CHW Next Follow Up: 7-28-23     Arlette Doss  Community Health Worker  [facility] Care Coordination  [email or web address]   Office: [phone]  Fax: [phone]

## 2023-05-25 ENCOUNTER — PATIENT OUTREACH (OUTPATIENT)
Dept: CARE COORDINATION | Facility: CLINIC | Age: 33
End: 2023-05-25
Payer: COMMERCIAL

## 2023-05-25 NOTE — PROGRESS NOTES
5/25/2023  Clinic Care Coordination Contact  Care Team Conversations    Received message from CC RN today patient transition to maintenance as of 5-25-23  CHW to follow up in 2 months.    CHW Follow up: 2 months  CHW Plan: Discuss graduating from CCC if no other goals or needs from CC Team  CHW Next Follow Up: 7-28-23    Arlette Doss  Community Health Worker  [facility] Care Coordination  [email or web address]   Office: [phone]  Fax: [phone]

## 2023-05-25 NOTE — PROGRESS NOTES
Clinic Care Coordination Contact    Situation: Patient chart reviewed by care coordinator.     Background: BLAYNE KELLEY to determine if maintenance is approved for pt after pt completed goals     Assessment: BLAYNE KELLEY reviewed chart and determined appropriate to move to maintenance as pt's goals are completed     Plan/Recommendations: Outreaches moved to every 2 month     KACIE Zeng   Social Work Care Coordinator   [facility]    [phone]

## 2023-07-28 ENCOUNTER — PATIENT OUTREACH (OUTPATIENT)
Dept: CARE COORDINATION | Facility: CLINIC | Age: 33
End: 2023-07-28

## 2023-07-28 NOTE — PROGRESS NOTES
7/28/2023  Clinic Care Coordination Contact  Community Health Worker Follow Up    Care Gaps:     Health Maintenance Due   Topic Date Due    ADVANCE CARE PLANNING  Never done    DEPRESSION ACTION PLAN  Never done    COVID-19 Vaccine (1) Never done    HIV SCREENING  Never done    HEPATITIS C SCREENING  Never done    YEARLY PREVENTIVE VISIT  09/09/2010    PHQ-9  07/17/2020    DTAP/TDAP/TD IMMUNIZATION (5 - Td or Tdap) 12/30/2020       Patient accepted scheduling phone number for [phone]  to schedule independently     Care Plan:     [facility]  Luiza  : Ben palaciosh  Maintenance 2 months follow up call.    Called and spoke to patient.  Discussed with patient if he has any new goals or other needs from CC team before graduating from [facility].    Patient stated he does not have new other goals or needs at this time.  Patient is crrently in [place]   Patient stated he will call the clinic to reschedule AWV at [facility].    Patient okay to graduate from [facility].  Patient has CHW contact information.   He will call CHW, if he needs help from CCC team in the future.    Patient has no other goals or need from Clinic Care Coordination.    Routed to CC  to review the chart and confirm if graduation is approved    Arlette Doss  Community Health Worker  [facility] Care Coordination  [email or web address]  [email or web address]   Office: [phone]  Fax: [phone]

## 2023-07-31 ENCOUNTER — PATIENT OUTREACH (OUTPATIENT)
Dept: CARE COORDINATION | Facility: CLINIC | Age: 33
End: 2023-07-31
Payer: COMMERCIAL

## 2023-07-31 NOTE — PROGRESS NOTES
7/31/2023  Clinic Care Coordination Contact  Care Team Conversations    Received message from BLAYNE KELLEY approved for graduation.     No further follow up from [facility] team.    Routed to PCP as RAIMUNDO Doss  Community Health Worker  [facility] Care Coordination  [email or web address]   Office: [phone]  Fax: [phone]

## 2023-07-31 NOTE — PROGRESS NOTES
Clinic Care Coordination Contact    Assessment: Care Coordinator contacted patient for 2 month follow up.  Patient has continued to follow the plan of care and assessment is negative for any new needs or concerns.    Enrollment status: Graduated.      Plan: No further outreaches at this time.  Patient will continue to follow the plan of care.  If new needs arise a new Care Coordination referral may be placed.  FYI to PCP    KACIE Zeng   Social Work Care Coordinator   [facility]    [phone]

## 2023-09-05 ENCOUNTER — HOSPITAL ENCOUNTER (EMERGENCY)
Facility: HOSPITAL | Age: 33
Discharge: HOME | End: 2023-09-05
Payer: COMMERCIAL

## 2023-09-05 VITALS
BODY MASS INDEX: 29.89 KG/M2 | DIASTOLIC BLOOD PRESSURE: 74 MMHG | TEMPERATURE: 99.1 F | RESPIRATION RATE: 16 BRPM | SYSTOLIC BLOOD PRESSURE: 130 MMHG | OXYGEN SATURATION: 98 % | WEIGHT: 158.2 LBS | HEART RATE: 70 BPM

## 2023-09-05 DIAGNOSIS — H10.13 ALLERGIC CONJUNCTIVITIS, BILATERAL: ICD-10-CM

## 2023-09-05 DIAGNOSIS — H57.89 REDNESS OF BOTH EYES: ICD-10-CM

## 2023-09-05 DIAGNOSIS — H04.203 EYE TEARING, BILATERAL: ICD-10-CM

## 2023-09-05 PROCEDURE — 99283 EMERGENCY DEPT VISIT LOW MDM: CPT

## 2023-09-05 RX ORDER — FLUTICASONE PROPIONATE 50 MCG
1 SPRAY, SUSPENSION (ML) NASAL DAILY
Qty: 16 G | Refills: 0 | Status: SHIPPED | OUTPATIENT
Start: 2023-09-05

## 2023-09-05 RX ORDER — OLOPATADINE HYDROCHLORIDE 2 MG/ML
1 SOLUTION/ DROPS OPHTHALMIC DAILY
Qty: 1.5 ML | Refills: 0 | Status: SHIPPED | OUTPATIENT
Start: 2023-09-05 | End: 2023-10-05

## 2023-09-05 RX ORDER — CETIRIZINE HYDROCHLORIDE 10 MG/1
10 TABLET ORAL DAILY
Qty: 30 TABLET | Refills: 0 | Status: SHIPPED | OUTPATIENT
Start: 2023-09-05 | End: 2023-10-05

## 2023-09-05 ASSESSMENT — ENCOUNTER SYMPTOMS
EYE PAIN: 1
EYE REDNESS: 1
EYE ITCHING: 1
EYE DISCHARGE: 1
FEVER: 0
CHILLS: 0

## 2023-09-06 NOTE — ED TRIAGE NOTES
Patient presents to ER for eye pain that has been on going since August 15th per patient.  Occastionally gets eye redness. Will use eye drops and sometimes works but hasn't been working as of lately.     Denies double or blurry vision.      Triage Assessment       Row Name 09/05/23 4949       Triage Assessment (Adult)    Airway WDL WDL       Respiratory WDL    Respiratory WDL WDL       Skin Circulation/Temperature WDL    Skin Circulation/Temperature WDL WDL       Cardiac WDL    Cardiac WDL WDL       Peripheral/Neurovascular WDL    Peripheral Neurovascular WDL WDL       Cognitive/Neuro/Behavioral WDL    Cognitive/Neuro/Behavioral WDL WDL

## 2023-09-06 NOTE — DISCHARGE INSTRUCTIONS
You were seen in the ER for eye itching, redness and tearing.  We suspect this is a condition called conjunctivitis, which means inflammation of the white part of your eye likely either caused by a virus or by allergies.  For your symptoms, we recommend using the eyedrops called Pataday 1 drop into both eyes daily to help reduce inflammation and tearing and itching.  Please also start taking an over-the-counter antihistamine called Zyrtec once a day.  Use Flonase nasal spray 1 spray into both nostrils daily to help with eye itching and tearing.  Please use warm compresses several times a day to help reduce eyelid inflammation and crusting.  We placed a referral for you to establish care with an eye doctor for follow-up and recheck in the next few days, they will call you to make an appointment.  Return to the ER if you have worsening pain, tearing, vision changes, vomiting, fevers or any other concerning symptoms.
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
## 2024-02-22 ENCOUNTER — OFFICE VISIT (OUTPATIENT)
Dept: FAMILY MEDICINE | Facility: CLINIC | Age: 34
End: 2024-02-22
Payer: COMMERCIAL

## 2024-02-22 VITALS
SYSTOLIC BLOOD PRESSURE: 117 MMHG | HEIGHT: 62 IN | TEMPERATURE: 97.8 F | RESPIRATION RATE: 16 BRPM | BODY MASS INDEX: 28.52 KG/M2 | HEART RATE: 64 BPM | DIASTOLIC BLOOD PRESSURE: 60 MMHG | OXYGEN SATURATION: 98 % | WEIGHT: 155 LBS

## 2024-02-22 DIAGNOSIS — E66.3 OVERWEIGHT (BMI 25.0-29.9): ICD-10-CM

## 2024-02-22 DIAGNOSIS — Z13.1 SCREENING FOR DIABETES MELLITUS: ICD-10-CM

## 2024-02-22 DIAGNOSIS — Z23 NEED FOR VACCINATION: ICD-10-CM

## 2024-02-22 DIAGNOSIS — Z11.59 NEED FOR HEPATITIS C SCREENING TEST: ICD-10-CM

## 2024-02-22 DIAGNOSIS — R41.9 NEUROCOGNITIVE DISORDER: ICD-10-CM

## 2024-02-22 DIAGNOSIS — Z13.220 SCREENING FOR HYPERLIPIDEMIA: ICD-10-CM

## 2024-02-22 DIAGNOSIS — Z59.41 FOOD INSECURITY: ICD-10-CM

## 2024-02-22 DIAGNOSIS — Z87.820 HISTORY OF TRAUMATIC BRAIN INJURY: ICD-10-CM

## 2024-02-22 DIAGNOSIS — H53.003 AMBLYOPIA OF BOTH EYES: ICD-10-CM

## 2024-02-22 DIAGNOSIS — H60.502 ACUTE OTITIS EXTERNA OF LEFT EAR, UNSPECIFIED TYPE: ICD-10-CM

## 2024-02-22 DIAGNOSIS — Z00.00 ROUTINE GENERAL MEDICAL EXAMINATION AT A HEALTH CARE FACILITY: Primary | ICD-10-CM

## 2024-02-22 DIAGNOSIS — Z11.4 SCREENING FOR HIV (HUMAN IMMUNODEFICIENCY VIRUS): ICD-10-CM

## 2024-02-22 PROBLEM — F33.1 MODERATE EPISODE OF RECURRENT MAJOR DEPRESSIVE DISORDER (H): Status: RESOLVED | Noted: 2020-08-07 | Resolved: 2024-02-22

## 2024-02-22 PROBLEM — F41.1 GENERALIZED ANXIETY DISORDER: Status: RESOLVED | Noted: 2020-08-07 | Resolved: 2024-02-22

## 2024-02-22 LAB — HBA1C MFR BLD: 5.9 % (ref 0–5.6)

## 2024-02-22 PROCEDURE — 99214 OFFICE O/P EST MOD 30 MIN: CPT | Mod: 25 | Performed by: STUDENT IN AN ORGANIZED HEALTH CARE EDUCATION/TRAINING PROGRAM

## 2024-02-22 PROCEDURE — 83036 HEMOGLOBIN GLYCOSYLATED A1C: CPT | Performed by: STUDENT IN AN ORGANIZED HEALTH CARE EDUCATION/TRAINING PROGRAM

## 2024-02-22 PROCEDURE — 36415 COLL VENOUS BLD VENIPUNCTURE: CPT | Performed by: STUDENT IN AN ORGANIZED HEALTH CARE EDUCATION/TRAINING PROGRAM

## 2024-02-22 PROCEDURE — 90471 IMMUNIZATION ADMIN: CPT | Performed by: STUDENT IN AN ORGANIZED HEALTH CARE EDUCATION/TRAINING PROGRAM

## 2024-02-22 PROCEDURE — 80061 LIPID PANEL: CPT | Performed by: STUDENT IN AN ORGANIZED HEALTH CARE EDUCATION/TRAINING PROGRAM

## 2024-02-22 PROCEDURE — 80053 COMPREHEN METABOLIC PANEL: CPT | Performed by: STUDENT IN AN ORGANIZED HEALTH CARE EDUCATION/TRAINING PROGRAM

## 2024-02-22 PROCEDURE — 99385 PREV VISIT NEW AGE 18-39: CPT | Mod: 25 | Performed by: STUDENT IN AN ORGANIZED HEALTH CARE EDUCATION/TRAINING PROGRAM

## 2024-02-22 PROCEDURE — 90686 IIV4 VACC NO PRSV 0.5 ML IM: CPT | Performed by: STUDENT IN AN ORGANIZED HEALTH CARE EDUCATION/TRAINING PROGRAM

## 2024-02-22 RX ORDER — CIPROFLOXACIN AND DEXAMETHASONE 3; 1 MG/ML; MG/ML
4 SUSPENSION/ DROPS AURICULAR (OTIC) 2 TIMES DAILY
Qty: 7.5 ML | Refills: 0 | Status: SHIPPED | OUTPATIENT
Start: 2024-02-22 | End: 2024-03-03

## 2024-02-22 SDOH — HEALTH STABILITY: PHYSICAL HEALTH: ON AVERAGE, HOW MANY MINUTES DO YOU ENGAGE IN EXERCISE AT THIS LEVEL?: 90 MIN

## 2024-02-22 SDOH — ECONOMIC STABILITY - FOOD INSECURITY: FOOD INSECURITY: Z59.41

## 2024-02-22 SDOH — HEALTH STABILITY: PHYSICAL HEALTH: ON AVERAGE, HOW MANY DAYS PER WEEK DO YOU ENGAGE IN MODERATE TO STRENUOUS EXERCISE (LIKE A BRISK WALK)?: 6 DAYS

## 2024-02-22 ASSESSMENT — ANXIETY QUESTIONNAIRES
3. WORRYING TOO MUCH ABOUT DIFFERENT THINGS: NOT AT ALL
IF YOU CHECKED OFF ANY PROBLEMS ON THIS QUESTIONNAIRE, HOW DIFFICULT HAVE THESE PROBLEMS MADE IT FOR YOU TO DO YOUR WORK, TAKE CARE OF THINGS AT HOME, OR GET ALONG WITH OTHER PEOPLE: NOT DIFFICULT AT ALL
2. NOT BEING ABLE TO STOP OR CONTROL WORRYING: NOT AT ALL
4. TROUBLE RELAXING: NOT AT ALL
8. IF YOU CHECKED OFF ANY PROBLEMS, HOW DIFFICULT HAVE THESE MADE IT FOR YOU TO DO YOUR WORK, TAKE CARE OF THINGS AT HOME, OR GET ALONG WITH OTHER PEOPLE?: NOT DIFFICULT AT ALL
GAD7 TOTAL SCORE: 0
GAD7 TOTAL SCORE: 0
7. FEELING AFRAID AS IF SOMETHING AWFUL MIGHT HAPPEN: NOT AT ALL
7. FEELING AFRAID AS IF SOMETHING AWFUL MIGHT HAPPEN: NOT AT ALL
GAD7 TOTAL SCORE: 0
6. BECOMING EASILY ANNOYED OR IRRITABLE: NOT AT ALL
1. FEELING NERVOUS, ANXIOUS, OR ON EDGE: NOT AT ALL
5. BEING SO RESTLESS THAT IT IS HARD TO SIT STILL: NOT AT ALL

## 2024-02-22 ASSESSMENT — SOCIAL DETERMINANTS OF HEALTH (SDOH): HOW OFTEN DO YOU GET TOGETHER WITH FRIENDS OR RELATIVES?: MORE THAN THREE TIMES A WEEK
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
## 2024-02-22 NOTE — COMMUNITY RESOURCES LIST (ENGLISH)
02/22/2024   [facility]  N/A  For questions about this resource list or additional care needs, please contact your primary care clinic or care manager.  Phone: [phone]   Email: N/A   Address: [address] [place] [id number]   Hours: N/A        Food and Nutrition       Food pantry  1  [facility] Distance: 1.59 miles      Pickup   [address] [place] [address]  Language: English  Hours: Mon - Fri 12:00 PM - 1:00 PM  Fees: Free   Phone: (716) 278-9646 Email: info@cam.org Website: https://www.[facility].org/locations/[facility]     2  [facility] Food Distribution [facility] Distance: 1.7 miles      In-Person   [address] [place] [address]  Language: English, Hmong, New Zealander  Hours: Tue 3:00 PM - 5:00 PM  Fees: Free   Phone: (900) 362-2326 Email: info@[facility] HospitalMeetingSpr[facility].org Website: http://[facility].org/programs/dfmvrc-auoqrgdtm-atcoda/     SNAP application assistance  3  [employer] [facility] Distance: 1.33 miles      In-Person   [address] [place] [address]  Language: English, New Zealander  Hours: Mon - Fri 8:30 AM - 5:00 PM  Fees: Free   Phone: (311) 918-6419 Email: info@clues.org Website: http://www.clues.org     4  [facility] [place] Distance: 1.98 miles      Phone/Virtual   [address] [place] [address]  Language: English  Hours: Mon - Thu 9:00 AM - 4:00 PM , Fri 9:00 AM - 2:00 PM , Sun 10:00 AM - 12:00 PM  Fees: Free   Phone: (329) 746-9225 Email: jairo@[facility].org Website: http://[facility].org/[facility]/vu-spuy-yxssk-ave/     Soup kitchen or free meals  5  [facility] Distance: 1.98 miles      [address] [place] [address]  Language: English  Hours: Mon - Tue 11:45 AM - 12:45 PM , Thu - Fri 11:45 AM - 12:45 PM  Fees: Free    Phone: (585) 481-9961 Email: jairo@[facility].AFreezey.org Website: http://[facility].org/[facility]/     6  [facility] Distance: 2.01 miles      In-Person   [address] [place] [address]  Language: English  Hours: Mon - Sun 6:30 AM - 7:30 AM , Mon - Sun 12:00 PM - 1:00 PM , Mon - Sun 5:30 PM - 6:30 PM  Fees: Free   Phone: (621) 612-4829 Email: info@[facility].Appointuit Website: https://[facility].org/about-us/contact/          Important Numbers & Websites       Emergency Services   911  [facility] Services   311  Poison Control   (671) 590-1136  Suicide Prevention Lifeline   (235) 900-3333 (TALK)  Child Abuse Hotline   (868) 976-9794 (4-A-Child)  Sexual Assault Hotline   (636) 105-5496 (HOPE)  National Runaway Safeline   (605) 644-9825 (RUNAWAY)  All-Options Talkline   (110) 240-9015  Substance Abuse Referral   (845) 777-8079 (HELP)

## 2024-02-22 NOTE — PATIENT INSTRUCTIONS
Preventive Care Advice   This is general advice given by our system to help you stay healthy. However, your care team may have specific advice just for you. Please talk to your care team about your preventive care needs.  Nutrition  Eat 5 or more servings of fruits and vegetables each day.  Try wheat bread, brown rice and whole grain pasta (instead of white bread, rice, and pasta).  Get enough calcium and vitamin D. Check the label on foods and aim for 100% of the RDA (recommended daily allowance).  Lifestyle  Exercise at least 150 minutes each week  (30 minutes a day, 5 days a week).  Do muscle strengthening activities 2 days a week. These help control your weight and prevent disease.  No smoking.  Wear sunscreen to prevent skin cancer.  Have a dental exam and cleaning every 6 months.  Yearly exams  See your health care team every year to talk about:  Any changes in your health.  Any medicines your care team has prescribed.  Preventive care, family planning, and ways to prevent chronic diseases.  Shots (vaccines)   HPV shots (up to age 26), if you've never had them before.  Hepatitis B shots (up to age 59), if you've never had them before.  COVID-19 shot: Get this shot when it's due.  Flu shot: Get a flu shot every year.  Tetanus shot: Get a tetanus shot every 10 years.  Pneumococcal, hepatitis A, and RSV shots: Ask your care team if you need these based on your risk.  Shingles shot (for age 50 and up)  General health tests  Diabetes screening:  Starting at age 35, Get screened for diabetes at least every 3 years.  If you are younger than age 35, ask your care team if you should be screened for diabetes.  Cholesterol test: At age 39, start having a cholesterol test every 5 years, or more often if advised.  Bone density scan (DEXA): At age 50, ask your care team if you should have this scan for osteoporosis (brittle bones).  Hepatitis C: Get tested at least once in your life.  STIs (sexually transmitted  infections)  Before age 24: Ask your care team if you should be screened for STIs.  After age 24: Get screened for STIs if you're at risk. You are at risk for STIs (including HIV) if:  You are sexually active with more than one person.  You don't use condoms every time.  You or a partner was diagnosed with a sexually transmitted infection.  If you are at risk for HIV, ask about PrEP medicine to prevent HIV.  Get tested for HIV at least once in your life, whether you are at risk for HIV or not.  Cancer screening tests  Cervical cancer screening: If you have a cervix, begin getting regular cervical cancer screening tests starting at age 21.  Breast cancer scan (mammogram): If you've ever had breasts, begin having regular mammograms starting at age 40. This is a scan to check for breast cancer.  Colon cancer screening: It is important to start screening for colon cancer at age 45.  Have a colonoscopy test every 10 years (or more often if you're at risk) Or, ask your provider about stool tests like a FIT test every year or Cologuard test every 3 years.  To learn more about your testing options, visit:   [email or web address].  For help making a decision, visit:   https://bit.ly/bj35534.  Prostate cancer screening test: If you have a prostate, ask your care team if a prostate cancer screening test (PSA) at age 55 is right for you.  Lung cancer screening: If you are a current or former smoker ages 50 to 80, ask your care team if ongoing lung cancer screenings are right for you.  For informational purposes only. Not to replace the advice of your health care provider. Copyright   2023 [employer]. All rights reserved. Clinically reviewed by the [facility]. [employer] 311340 - REV 01/24.

## 2024-02-23 ENCOUNTER — PATIENT OUTREACH (OUTPATIENT)
Dept: CARE COORDINATION | Facility: CLINIC | Age: 34
End: 2024-02-23
Payer: COMMERCIAL

## 2024-02-23 LAB
ALBUMIN SERPL BCG-MCNC: 4.8 G/DL (ref 3.5–5.2)
ALP SERPL-CCNC: 74 U/L (ref 40–150)
ALT SERPL W P-5'-P-CCNC: 29 U/L (ref 0–70)
ANION GAP SERPL CALCULATED.3IONS-SCNC: 10 MMOL/L (ref 7–15)
AST SERPL W P-5'-P-CCNC: 28 U/L (ref 0–45)
BILIRUB SERPL-MCNC: 0.6 MG/DL
BUN SERPL-MCNC: 20.1 MG/DL (ref 6–20)
CALCIUM SERPL-MCNC: 9.4 MG/DL (ref 8.6–10)
CHLORIDE SERPL-SCNC: 105 MMOL/L (ref 98–107)
CHOLEST SERPL-MCNC: 230 MG/DL
CREAT SERPL-MCNC: 0.96 MG/DL (ref 0.67–1.17)
DEPRECATED HCO3 PLAS-SCNC: 25 MMOL/L (ref 22–29)
EGFRCR SERPLBLD CKD-EPI 2021: >90 ML/MIN/1.73M2
FASTING STATUS PATIENT QL REPORTED: NO
GLUCOSE SERPL-MCNC: 93 MG/DL (ref 70–99)
HDLC SERPL-MCNC: 42 MG/DL
LDLC SERPL CALC-MCNC: 167 MG/DL
NONHDLC SERPL-MCNC: 188 MG/DL
POTASSIUM SERPL-SCNC: 4 MMOL/L (ref 3.4–5.3)
PROT SERPL-MCNC: 7.9 G/DL (ref 6.4–8.3)
SODIUM SERPL-SCNC: 140 MMOL/L (ref 135–145)
TRIGL SERPL-MCNC: 104 MG/DL

## 2024-02-23 NOTE — PROGRESS NOTES
2/23/2024  Clinic Care Coordination Contact  Community Health Worker Initial Outreach          Patient accepts CC: No, already has support from [facility] to renew SNAP benefit and renew health insurance. Patient will be sent Care Coordination introduction letter for future reference.     PCP referral: would like to get food stamps again     [facility] Luiza : Way  Called and spoke with patient regarding referral.  Patient just need help to reapply for SNAP benefits  He already renew the SNAP benefit end of last month January 2024 with [employer]  He has not heard back from the [facility] about the food stamp.  Offered to connect with Food Resources Navigator while he waits for SNAP benefit to be approved..   Encouraged patient to call to [facility] [employer] to check on the status of SNAP benefit [phone] or seek support from staff [employer] for support.  Patient took down the EZ info line number and the will call   Pharmacy said insurance is not active.  Patient reported he already renew health insurance with [employer] in January 2024  [place].  Suggested patient to seek support from staff at [facility] to check on status and follow up    No other support or needs from CCC team this time.  No referral to FRW is needed at this time.  Patient will call CHW if he needs support from FRW.  Patient has support from [facility] for support to follow up on SNAP and insurance  Closed referral   CHW sent staff message to FRN for support with food resources due to SNAP benefit is pending.    Routed to PCP and CC SW as RAIMUNDO Doss  Community Health Worker  [facility]  Clinic Care Coordination  [email or web address]   Office: [phone]  Fax: [phone]

## 2024-02-23 NOTE — Clinical Note
FYI Referral to FRN for food resources No referral to FRW because pt already renew SNAP benefit and renew insurance with [facility]  Pt just waiting for county process applications

## 2024-02-23 NOTE — PROGRESS NOTES
[place] Department of [place]: [place] Health [place] Programs Eligibility Response (271)  print Print Page            SUBSCRIBER INFORMATION  Date of Service Subscriber ID Subscriber Name Birthdate Age Gender  02/23/2024  HSER PAW 1990 33      Address  , , ,       PROVIDER INFORMATION  Provider ID Submitter Transaction ID Provider Name Taxonomy Code Qualifier Taxonomy Code  [id number] xx [facility] [place]           Request Validation Messages  Subscriber is inactive.

## 2024-02-26 ENCOUNTER — PATIENT OUTREACH (OUTPATIENT)
Dept: CARE COORDINATION | Facility: CLINIC | Age: 34
End: 2024-02-26
Payer: COMMERCIAL

## 2024-02-26 ENCOUNTER — TELEPHONE (OUTPATIENT)
Dept: FAMILY MEDICINE | Facility: CLINIC | Age: 34
End: 2024-02-26
Payer: COMMERCIAL

## 2024-02-26 PROBLEM — R73.03 PREDIABETES: Status: ACTIVE | Noted: 2024-02-26

## 2024-02-26 PROBLEM — E78.2 MODERATE MIXED HYPERLIPIDEMIA NOT REQUIRING STATIN THERAPY: Status: ACTIVE | Noted: 2024-02-26

## 2024-02-26 NOTE — TELEPHONE ENCOUNTER
----- Message from Marleni Bolaños MD sent at 2/26/2024  2:44 PM CST -----  Cholesterol levels are high.  I recommend Lifestyle modifications, including: Increasing intake of vegetables and fruits, decreasing intake of processed foods/carbohydrates/sugars, having regular physical activity, and losing weight.    Electrolytes, kidney function, and liver function were normal.    Screening for diabetes showed that you have prediabetes. This means that you are at increased risk of developing diabetes in the future. We could start you on a medication called metformin, if you would like. Otherwise, I recommend Lifestyle modifications as noted above.     4

## 2024-02-26 NOTE — PROGRESS NOTES
Referral from [facility] to talk to patient about food resource options and [employer] sign up.    I spoke to this patient and phone marian Wayne ID: [id number]. Pt is interested in getting help with additional food resources as his SNAP benefits are pending. Pt states he drives around somewhat to get around the tw[place] and has been picking up shelf stable food items from [facility] in [place]. Doing OK there for the food shelf help.     He would like to enroll in [employer]. I explained the $80/monthly voucher and the possibility that [employer] might be piloting a stop at the [facility] sometime this year.     -Mailing Food Resource Packet with $40 Cub gc, engagement materials, recipe    -[employer] packet to come in the mail with scheduled stops in [facility] and [facility].     Will plan to follow up in 1 month.   Thankyou for your referral.    Brian Baires  Community Advancement Food Resource Navigator  [employer] [facility] [employer]   Cell: [phone]

## 2024-12-16 ENCOUNTER — PATIENT OUTREACH (OUTPATIENT)
Dept: CARE COORDINATION | Facility: CLINIC | Age: 34
End: 2024-12-16
Payer: COMMERCIAL

## 2024-12-16 NOTE — PROGRESS NOTES
12/16/2024  Clinic Care Coordination Contact  Community Health Worker Initial Outreach    CHW Initial Information Gathering:  Referral Source: Self-patient/Caregiver  Preferred Urgent Care: [facility], [phone]  Current living arrangement:: I live in a private home with family, I live in a private home with spouse  Type of residence:: Apartment  Community Resources: None  Supplies Currently Used at Home: None  No PCP office visit in Past Year: No  Transportation means:: Regular car  CHW Additional Questions  If ED/Hospital discharge, follow-up appointment scheduled as recommended?: N/A  Medication changes made following ED/Hospital discharge?: N/A  MyChart active?: No    Patient accepts CC: No, it is about his daughter. Needs help with his daughter not for him. Patient will be sent Care Coordination introduction letter for future reference.     Received VM from patient 12-12-24 to call him back.    Luiza : Payma ID#[id number]   Called and spoke with patient regarding VM  Stated he has complaint about his daughter eye's doctor.  Closed encounter due to issues about his daughter.    Closed referral.     Arlette Doss  Community Health Worker  [facility] Care Coordination  [email or web address]   Office: [phone]  Fax: [phone]

## 2025-01-14 ENCOUNTER — PATIENT OUTREACH (OUTPATIENT)
Dept: CARE COORDINATION | Facility: CLINIC | Age: 35
End: 2025-01-14

## 2025-01-14 NOTE — PROGRESS NOTES
1/14/2025  Clinic Care Coordination Contact  Community Health Worker Initial Outreach    CHW Initial Information Gathering:  Referral Source: Self-patient/Caregiver       Patient accepts CC: No, just need help to schedule dental appt for him and his wife. Patient will be sent Care Coordination introduction letter for future reference.     Self referral: dental appt    Luiza : Iliana ID# [id number]     Received a call from patient. Requested help to schedule dental appt for him and his wife.  CHW suggested patient to call to the dental clinic to schedule dental   Patient does not know the name of the clinic but it on on  on [facility] dental clinic on [address].  CHW provided the patient the number to [facility] they [facility]  [address] [address]  Phone: [phone]  Fax: [phone]  Patient wrote down the number and will call to schedule.    Order for Care Management has been closed, no further outreach will be done at this time     Arlette Doss  Community Health Worker  [facility] Care Coordination  [email or web address]   Office: [phone]  Fax: [phone]

## 2025-02-13 ENCOUNTER — PATIENT OUTREACH (OUTPATIENT)
Dept: CARE COORDINATION | Facility: CLINIC | Age: 35
End: 2025-02-13
Payer: COMMERCIAL

## 2025-02-13 ENCOUNTER — TELEPHONE (OUTPATIENT)
Dept: FAMILY MEDICINE | Facility: CLINIC | Age: 35
End: 2025-02-13
Payer: COMMERCIAL

## 2025-02-13 DIAGNOSIS — Z71.89 OTHER SPECIFIED COUNSELING: Primary | Chronic | ICD-10-CM

## 2025-02-13 NOTE — PROGRESS NOTES
2/13/2025  Clinic Care Coordination Contact  Community Health Worker Initial Outreach    CHW Initial Information Gathering:  Referral Source: Self-patient/Caregiver  Preferred Hospital: [facility]  [phone]  Preferred Urgent Care: [facility], [phone]  Current living arrangement:: I live in a private home with family, I live in a private home with spouse  Type of residence:: Apartment  Community Resources: Financial/Insurance  Supplies Currently Used at Home: None  Equipment Currently Used at Home: none  Informal Support system:: Spouse  No PCP office visit in Past Year: No  Transportation means:: Regular car  CHW Additional Questions  If ED/Hospital discharge, follow-up appointment scheduled as recommended?: N/A  Medication changes made following ED/Hospital discharge?: N/A  MyChart active?: No  Patient agreeable to assistance with activating MyChart?: No    Patient accepts CC: No, only medical bill issue no other goals or needs. Patient will be sent Care Coordination introduction letter for future reference.     CHW called and spoke with billing dept [phone] and spoke to representative regarding medical bill $[id number].  CHW provided the new [facility] insurance ID# and PMI#.  Stated that patient did not have active insurance in Feb 2024 and insurance was active starting . The service date was on 2-22-24.  Stated patient only has a week left to response.    Luiza : Marta ID# [id number]     Called and spoke with patient regarding the medical bill he dropped off at the clinic.  Patient stated he is at work and not able to talk long.  Best day to call is Friday when he is off.  CHW explained to patient that he did not have active insurance in Feb 2024 and new [employer] insurance was effective on  and the service date was on 2-22-24.  He did not have insurance in February 2024.  Patient did not know and thought he has insurance.  He would like  assistance with the medical bill.  CHW discussed referral to FRW for support to screen for financial assistance ([employer]).  Patient agreed with the referral and will wait for FRW to call.  Stated best day to call is Friday since he is off.    Patient does not have any other needs or support from CCC teams just the medical bill.  ONLY FRW support no CCC team needed at this time.    Order for Care Management has been closed, no further outreach will be done at this time.    Arlette Doss  Community Health Worker  [facility] Care Coordination  [email or web address]   Office: [phone]  Fax: [phone]

## 2025-02-17 ENCOUNTER — PATIENT OUTREACH (OUTPATIENT)
Dept: CARE COORDINATION | Facility: CLINIC | Age: 35
End: 2025-02-17
Payer: COMMERCIAL

## 2025-02-17 NOTE — PROGRESS NOTES
FRW Update  2/17/25 FRW called and patient did not need FRW help at this time with billing and so FRW is closing program